# Patient Record
Sex: MALE | Race: OTHER | NOT HISPANIC OR LATINO | URBAN - METROPOLITAN AREA
[De-identification: names, ages, dates, MRNs, and addresses within clinical notes are randomized per-mention and may not be internally consistent; named-entity substitution may affect disease eponyms.]

---

## 2019-07-19 DIAGNOSIS — M25.561 RIGHT KNEE PAIN, UNSPECIFIED CHRONICITY: Primary | ICD-10-CM

## 2019-07-23 ENCOUNTER — HOSPITAL ENCOUNTER (OUTPATIENT)
Dept: RADIOLOGY | Facility: HOSPITAL | Age: 34
Discharge: HOME | End: 2019-07-23
Attending: ORTHOPAEDIC SURGERY
Payer: COMMERCIAL

## 2019-07-23 ENCOUNTER — OFFICE VISIT (OUTPATIENT)
Dept: ORTHOPEDICS | Facility: CLINIC | Age: 34
End: 2019-07-23
Payer: COMMERCIAL

## 2019-07-23 VITALS
BODY MASS INDEX: 23.8 KG/M2 | HEIGHT: 71 IN | DIASTOLIC BLOOD PRESSURE: 55 MMHG | WEIGHT: 170 LBS | HEART RATE: 43 BPM | SYSTOLIC BLOOD PRESSURE: 96 MMHG

## 2019-07-23 DIAGNOSIS — M25.571 CHRONIC PAIN OF RIGHT ANKLE: ICD-10-CM

## 2019-07-23 DIAGNOSIS — M76.821 POSTERIOR TIBIAL TENDINITIS OF RIGHT LEG: Primary | ICD-10-CM

## 2019-07-23 DIAGNOSIS — M25.561 RIGHT KNEE PAIN, UNSPECIFIED CHRONICITY: ICD-10-CM

## 2019-07-23 DIAGNOSIS — M25.571 RIGHT ANKLE PAIN, UNSPECIFIED CHRONICITY: Primary | ICD-10-CM

## 2019-07-23 DIAGNOSIS — M25.571 RIGHT ANKLE PAIN, UNSPECIFIED CHRONICITY: ICD-10-CM

## 2019-07-23 DIAGNOSIS — G89.29 CHRONIC PAIN OF RIGHT ANKLE: ICD-10-CM

## 2019-07-23 PROCEDURE — 73610 XR ANKLE COMPLETE 3 VIEW RIGHT: ICD-10-PCS | Mod: 26,RT,, | Performed by: RADIOLOGY

## 2019-07-23 PROCEDURE — 99999 PR PBB SHADOW E&M-EST. PATIENT-LVL III: CPT | Mod: PBBFAC,,, | Performed by: ORTHOPAEDIC SURGERY

## 2019-07-23 PROCEDURE — 99204 OFFICE O/P NEW MOD 45 MIN: CPT | Mod: S$GLB,,, | Performed by: ORTHOPAEDIC SURGERY

## 2019-07-23 PROCEDURE — 99999 PR PBB SHADOW E&M-EST. PATIENT-LVL III: ICD-10-PCS | Mod: PBBFAC,,, | Performed by: ORTHOPAEDIC SURGERY

## 2019-07-23 PROCEDURE — 73610 X-RAY EXAM OF ANKLE: CPT | Mod: 26,RT,, | Performed by: RADIOLOGY

## 2019-07-23 PROCEDURE — 73610 X-RAY EXAM OF ANKLE: CPT | Mod: TC,RT

## 2019-07-23 PROCEDURE — 99204 PR OFFICE/OUTPT VISIT, NEW, LEVL IV, 45-59 MIN: ICD-10-PCS | Mod: S$GLB,,, | Performed by: ORTHOPAEDIC SURGERY

## 2019-07-23 RX ORDER — CELECOXIB 200 MG/1
200 CAPSULE ORAL 2 TIMES DAILY
Qty: 28 CAPSULE | Refills: 0 | Status: SHIPPED | OUTPATIENT
Start: 2019-07-23 | End: 2019-08-06

## 2019-07-23 NOTE — PROGRESS NOTES
Subjective:     Patient ID: Md Sorenleoniechao Johnson is a 34 y.o. male.    Chief Complaint: Pain of the Right Ankle    Mr. Johnson is a .    He is here for evaluation of right medial ankle pain. He 1st started having pain back in January he noticed this might be related to the high steel toe boots which were rubbing on the area. He has been having a lot more pain since especially with running, no pain at the beginning of the day but after the end of the day after standing and walking he has a lot of this medial ankle pain, the pain is located well above the medial malleolus several cm are inches above the medial malleolus.  Seems to be the posterior medial border here of the tibia. No specific injury that he can recall.    Ankle Pain    The pain is present in the right ankle. The current episode started more than 1 month ago (6mo). The injury was the result of a action while at home. The problem occurs intermittently. The problem has been gradually worsening. The quality of the pain is described as aching. The pain is at a severity of 5/10. Pertinent negatives include no fever or itching. The symptoms are aggravated by activity, bearing weight, walking and standing. He has tried nothing for the symptoms. Physical therapy was not tried.      No past medical history on file.  No past surgical history on file.  No family history on file.  Social History     Socioeconomic History    Marital status:      Spouse name: Not on file    Number of children: Not on file    Years of education: Not on file    Highest education level: Not on file   Occupational History    Not on file   Social Needs    Financial resource strain: Not on file    Food insecurity:     Worry: Not on file     Inability: Not on file    Transportation needs:     Medical: Not on file     Non-medical: Not on file   Tobacco Use    Smoking status: Never Smoker    Smokeless tobacco: Never Used   Substance and Sexual Activity    Alcohol use:  Never     Frequency: Never    Drug use: Never    Sexual activity: Not on file   Lifestyle    Physical activity:     Days per week: Not on file     Minutes per session: Not on file    Stress: Not on file   Relationships    Social connections:     Talks on phone: Not on file     Gets together: Not on file     Attends [employer] service: Not on file     Active member of club or organization: Not on file     Attends meetings of clubs or organizations: Not on file     Relationship status: Not on file   Other Topics Concern    Not on file   Social History Narrative    Not on file       Review of patient's allergies indicates:  No Known Allergies  Review of Systems   Constitution: Negative for fever.   HENT: Negative for sore throat.    Eyes: Negative for blurred vision.   Cardiovascular: Negative for dyspnea on exertion.   Respiratory: Negative for shortness of breath.    Hematologic/Lymphatic: Does not bruise/bleed easily.   Skin: Negative for itching.   Gastrointestinal: Negative for vomiting.   Genitourinary: Negative for dysuria.   Neurological: Negative for dizziness.   Psychiatric/Behavioral: The patient does not have insomnia.        Objective:   Body mass index is 23.71 kg/m².  Vitals:    07/23/19 1458   BP: (!) 96/55   Pulse: (!) 43           General    Nursing note and vitals reviewed.  Constitutional: He is oriented to person, place, and time. He appears well-developed. No distress.   HENT:   Head: Normocephalic and atraumatic.   Eyes: EOM are normal.   Cardiovascular: Normal rate.    Pulmonary/Chest: Effort normal. No stridor.   Neurological: He is alert and oriented to person, place, and time.   Psychiatric: He has a normal mood and affect. His behavior is normal.     General Musculoskeletal Exam   Gait: normal     Right Ankle/Foot Exam     Inspection   Erythema: absent  Bruising: Ankle - absent   Effusion: Ankle - absent     Range of Motion   Ankle Joint   Dorsiflexion: 5 (5° with the knee extended,  flexed is 10° with the knee flexed)   Plantar flexion: 30   Subtalar Joint   Inversion: normal   Eversion: normal     Other   Ankle Crepitus: absent  Foot Crepitus:  absent  Sensation: normal    Comments:  He has moderate flatfoot bilaterally, decreased medial arch is, able to do single leg heel rise bilaterally without much discomfort    Palpation over the posterior border of the tibia in the expected course of the posterior tibial tendon is painful for him, and other than flatfoot is the most impressive physical exam findings    Left Ankle/Foot Exam     Inspection  Erythema: absent  Bruising: Ankle - absent   Effusion: Ankle - absent     Range of Motion   Ankle Joint  Dorsiflexion: 5 (5° with the knee extended, increases to 10° with the knee flexed)   Plantar flexion: 30     Subtalar Joint   Inversion: normal   Eversion: normal     Other   Foot Crepitus:  absent  Ankle Crepitus: absent  Sensation: normal        IMAGING three views of the right ankle ordered by me and interpreted by me demonstrate no obvious fracture or dislocation, no substantial degenerative changes to the ankle    Assessment:     Encounter Diagnoses   Name Primary?    Chronic pain of right ankle     Posterior tibial tendinitis of right leg Yes        Plan:       I had an in depth discussion today with Isauro today regarding his RIGHT ankle and distal leg problem, going over his radiographs and the model to help further his understanding. I explained the anatomy and pathophysiology of the problem. I told Isauro  that I believe the problem relates to differential including posterior tibial tendon tendinitis, stress fracture . We had an in depth discussion regarding appropriate treatment and management of his condition.     From a treatment standpoint, the decision was made to go forward with :     Discussed that it is reasonable to perform an MRI of the right ankle to further evaluate and rule out any stress fracture here given pain with running and  potential overuse injury, but overall I think this is relatively low on the differential.  He does not want to do the MRI and this is reasonable at this time but I discussed with him that if he is having a lot of pain that is not getting better, he needs to the MRI.    Rest, ice, activity modification    Stop running for now    Short Cam boot for rest    Stretching    Celebrex for 2 weeks, discussed pros and cons, black box warning, he expressed good understanding like to proceed, consider transition to Voltaren gel afterwards if needed    Follow-up in 8 weeks for recheck without new x-rays unless he is still having significant issues.  MRI right ankle in the interim if symptoms acutely worsen

## 2019-08-27 ENCOUNTER — TELEPHONE (OUTPATIENT)
Dept: ORTHOPEDICS | Facility: CLINIC | Age: 34
End: 2019-08-27

## 2019-08-27 NOTE — TELEPHONE ENCOUNTER
Called pt to reschedule his apt. Pt stated he is doing better and no longer wants the apt. I informed the pt if he need anything to call and make an apt. Pt understood.

## 2022-03-25 ENCOUNTER — HOSPITAL ENCOUNTER (OUTPATIENT)
Dept: RADIOLOGY | Facility: HOSPITAL | Age: 37
Discharge: HOME | End: 2022-03-25
Attending: FAMILY MEDICINE
Payer: COMMERCIAL

## 2022-03-25 ENCOUNTER — OFFICE VISIT (OUTPATIENT)
Dept: SPORTS MEDICINE | Facility: CLINIC | Age: 37
End: 2022-03-25
Payer: COMMERCIAL

## 2022-03-25 VITALS — WEIGHT: 170 LBS | HEIGHT: 71 IN | BODY MASS INDEX: 23.8 KG/M2

## 2022-03-25 DIAGNOSIS — M76.61 ACHILLES TENDINITIS OF BOTH LOWER EXTREMITIES: Primary | ICD-10-CM

## 2022-03-25 DIAGNOSIS — M79.604 BILATERAL LEG PAIN: Primary | ICD-10-CM

## 2022-03-25 DIAGNOSIS — S86.899A ANTERIOR SHIN SPLINTS: ICD-10-CM

## 2022-03-25 DIAGNOSIS — M76.62 ACHILLES TENDINITIS OF BOTH LOWER EXTREMITIES: Primary | ICD-10-CM

## 2022-03-25 DIAGNOSIS — M79.604 BILATERAL LEG PAIN: ICD-10-CM

## 2022-03-25 DIAGNOSIS — M79.605 BILATERAL LEG PAIN: Primary | ICD-10-CM

## 2022-03-25 DIAGNOSIS — M79.605 BILATERAL LEG PAIN: ICD-10-CM

## 2022-03-25 PROCEDURE — 73590 XR TIBIA FIBULA BILATERAL: ICD-10-PCS | Mod: 26,50,, | Performed by: RADIOLOGY

## 2022-03-25 PROCEDURE — 73590 X-RAY EXAM OF LOWER LEG: CPT | Mod: 26,50,, | Performed by: RADIOLOGY

## 2022-03-25 PROCEDURE — 73590 X-RAY EXAM OF LOWER LEG: CPT | Mod: TC,50

## 2022-03-25 PROCEDURE — 3008F BODY MASS INDEX DOCD: CPT | Mod: CPTII,S$GLB,, | Performed by: FAMILY MEDICINE

## 2022-03-25 PROCEDURE — 99999 PR PBB SHADOW E&M-EST. PATIENT-LVL III: ICD-10-PCS | Mod: PBBFAC,,, | Performed by: FAMILY MEDICINE

## 2022-03-25 PROCEDURE — 99204 PR OFFICE/OUTPT VISIT, NEW, LEVL IV, 45-59 MIN: ICD-10-PCS | Mod: S$GLB,,, | Performed by: FAMILY MEDICINE

## 2022-03-25 PROCEDURE — 3008F PR BODY MASS INDEX (BMI) DOCUMENTED: ICD-10-PCS | Mod: CPTII,S$GLB,, | Performed by: FAMILY MEDICINE

## 2022-03-25 PROCEDURE — 99999 PR PBB SHADOW E&M-EST. PATIENT-LVL III: CPT | Mod: PBBFAC,,, | Performed by: FAMILY MEDICINE

## 2022-03-25 PROCEDURE — 99204 OFFICE O/P NEW MOD 45 MIN: CPT | Mod: S$GLB,,, | Performed by: FAMILY MEDICINE

## 2022-03-25 NOTE — PROGRESS NOTES
Subjective:     Patient ID: Md Richmond Elizabeth is a 37 y.o. male.    Chief Complaint: Pain of the Left Lower Leg and Pain of the Right Lower Leg      HPI:  States that he likes to jog a few miles every day for general exercise but about 3 years ago began having pain in both lower legs.    He went to [facility] where Dr. rider was attempted but he did not have a good experience with this.  He was told that he had some type of Achilles tendinitis.    Since that time he has doing his best but not under any specific therapy plan.  He would like to be able to run and be more active again without pain    He currently has pain in the back of the heel and in the medial aspect of the tibia region sometimes a 6-10 pain and Mobic is mildly helpful.  History reviewed. No pertinent past medical history.  History reviewed. No pertinent surgical history.  History reviewed. No pertinent family history.  Social History     Socioeconomic History    Marital status:    Tobacco Use    Smoking status: Never Smoker    Smokeless tobacco: Never Used   Substance and Sexual Activity    Alcohol use: Never    Drug use: Never     No current outpatient medications on file.  Review of patient's allergies indicates:  No Known Allergies  Review of Systems   Constitutional: Negative for chills, fever and weight loss.   Respiratory: Negative for shortness of breath.    Cardiovascular: Negative for chest pain and palpitations.       Objective:   Body mass index is 23.71 kg/m².  There were no vitals filed for this visit.        Ortho/SPM Exam-alert oriented well-nourished well-developed ambulatory no acute distress    First ray after normal    Lower extremities-good musculature and tone noted in no acute deformities    With mild tenderness to the insertion points of the Achilles but full range of motion and no Achilles defect evident    Patient has tenderness at the medial tibial juncture.    Neurovascular  intact    Strength is full 5    Psychiatric good affect cognition    Plan-patient will start physical therapy here [place] and is expected to have good progress.  May use over-the-counter medicine and will torn gel as needed.    Patient Instructions   Ice for 15 minutes to legs as needed for pain associated with shin splints at Achilles tendinitis    Start physical therapy at [place]    Over-the-counter Voltaren gel applied to painful areas 3 times a day for 1 month    Tylenol or Advil twice a day as needed for pain    Recheck here 6 weeks      IMAGING: X-Ray Tibia Fibula Bilateral  Narrative: EXAMINATION:  XR TIBIA FIBULA BILATERAL    CLINICAL HISTORY:  Pain in right leg    TECHNIQUE:  AP and lateral views of the bilateral tibia and fibula    COMPARISON:  None    FINDINGS:  The tibia and fibula are intact bilaterally.  No acute fracture or dislocation.  No significant soft tissue swelling.  Dorsal and plantar calcaneal enthesophytes seen on the right and dorsal calcaneal enthesophytes seen on the left.  Impression: As above    Electronically signed by: Kameron Bains DO  Date:    03/25/2022  Time:    08:55       Radiographs / Imaging : Relevant imaging results reviewed by me and interpreted by me, discussed with the patient and / or family -x-rays reviewed by me and agree with report      Assessment:     Encounter Diagnoses   Name Primary?    Achilles tendinitis of both lower extremities Yes    Anterior shin splints         Plan:   Achilles tendinitis of both lower extremities    Anterior shin splints        The patient verbalized good understanding of the medical issues discussed today and expressed appreciation for the care provided.  Patient was given the opportunity to ask questions and be an active participant in their medical care. Patient had no further questions or concerns at this time.     The patient was encouraged to participate in appropriate physical activity.      Gabino Mata M.D.  [facility]        This note was dictated using voice recognition software and may have sound a like errors.

## 2022-03-25 NOTE — PATIENT INSTRUCTIONS
Ice for 15 minutes to legs as needed for pain associated with shin splints at Achilles tendinitis    Start physical therapy at [place]    Over-the-counter Voltaren gel applied to painful areas 3 times a day for 1 month    Tylenol or Advil twice a day as needed for pain    Recheck here 6 weeks

## 2022-05-09 ENCOUNTER — TELEPHONE (OUTPATIENT)
Dept: SPORTS MEDICINE | Facility: CLINIC | Age: 37
End: 2022-05-09
Payer: COMMERCIAL

## 2022-05-19 ENCOUNTER — CLINICAL SUPPORT (OUTPATIENT)
Dept: REHABILITATION | Facility: HOSPITAL | Age: 37
End: 2022-05-19
Payer: COMMERCIAL

## 2022-05-19 DIAGNOSIS — R53.1 DECREASED STRENGTH, ENDURANCE, AND MOBILITY: Primary | ICD-10-CM

## 2022-05-19 DIAGNOSIS — R68.89 DECREASED STRENGTH, ENDURANCE, AND MOBILITY: Primary | ICD-10-CM

## 2022-05-19 DIAGNOSIS — M76.62 ACHILLES TENDINITIS OF BOTH LOWER EXTREMITIES: ICD-10-CM

## 2022-05-19 DIAGNOSIS — M79.672 LEFT FOOT PAIN: ICD-10-CM

## 2022-05-19 DIAGNOSIS — M76.61 ACHILLES TENDINITIS OF BOTH LOWER EXTREMITIES: ICD-10-CM

## 2022-05-19 DIAGNOSIS — S86.899A ANTERIOR SHIN SPLINTS: ICD-10-CM

## 2022-05-19 DIAGNOSIS — Z74.09 DECREASED STRENGTH, ENDURANCE, AND MOBILITY: Primary | ICD-10-CM

## 2022-05-19 PROCEDURE — 97110 THERAPEUTIC EXERCISES: CPT | Performed by: PHYSICAL THERAPIST

## 2022-05-19 PROCEDURE — 97161 PT EVAL LOW COMPLEX 20 MIN: CPT | Performed by: PHYSICAL THERAPIST

## 2022-05-19 NOTE — PATIENT INSTRUCTIONS
Toe Series - Toe Yoga        Sit with knee stacked above ankle. Maintain the ball of the foot and heel on the floor the entire exercise.    1) Lift the big toe, keeping the little toes planted on the floor. Hold for 5 seconds  2) Lift the little toes, keeping the big toe planted on the floor. Hold for 5 seconds  3) Time yourself for 3 minutes, alternating positions 1 & 2 back and forth throughout that time.      Toe Series - Abduction/Adduction          Sit with knee stacked above ankle    1) Abduction - Spread toes as far apart from each other as possible. The toes may lift off the floor. Hold for seconds  2) Adduction - Push toes together. Hold for 5 seconds  3) Time yourself for 3 minutes, alternating positions 1 & 2 back and forth throughout that time.      Toe Series - Flexion/Extension        Sit with knee stacked above ankle    1) Flexion - Curl toes down toward floor. Hold for 5 seconds  2) Extension - Lift toes up toward ceiling. Hold for 5 seconds  3) Time yourself for 3 minutes, alternating positions 1 & 2 back and forth throughout that time.  4) Place towel roll under ball of foot, but keep heel on the ground.      Arch Raise        1) Sit in a chair with both feet placed flat on the floor   2) Raise the arch of your foot by sliding your big toe toward your heel without curling your toes or lifting your heel S  3) Hold the position for 3 seconds then relax and repeat  4) Time yourself for 3 minutes, relaxing and repeating throughout that time.  5) Variations can be performed by moving the feet farther away from you or turning the foot inward or outward to challenge the muscles from different positions.  6) Once you feel comfortable performing the short foot movement you can gradually progress to performing the exercise while standing and then eventually from a single-leg standing position.      CALF STRETCH WITH TOWEL - GASTROCNEMIUS        While in a seated position, place a towel around the ball of  your foot and pull your ankle back until a stretch is felt on your calf area.    Keep your knee in a straightened position during the stretch.    Hold stretch for 30 seconds and repeat 3 times on each side, 3 times per day       CALF STRETCH WITH TOWEL - SOLEUS        While in a seated position, place a towel around the ball of your foot and pull your ankle back until a stretch is felt on your calf area.    Keep your knee in a bent position during the stretch.    Hold stretch for 30 seconds and repeat 3 times on each side, 3 times per day       *All exercises listed above obtained from [email or web address]

## 2022-05-19 NOTE — PLAN OF CARE
"[facility]   Physical Therapy Initial Evaluation   Date: 5/19/2022   Name: Md Richmond Elizabeth  Clinic Number: [id number]    Therapy Diagnosis:    Encounter Diagnoses   Name Primary?    Decreased strength, endurance, and mobility Yes    Left foot pain     Achilles tendinitis of both lower extremities     Anterior shin splints       Physician: Gabino Mata MD     Physician Orders: PT Eval and Treat  Medical Diagnosis from Referral: achilles tendinitis of both lower extremities   Evaluation Date: 5/19/2022  Authorization Period Expiration: 3/25/2023  Plan of Care Expiration: 8/17/2022  Progress Note Due: 6/18/2022  Visit # / Visits authorized: 1/1   FOTO: 1/3     Precautions: Standard    Time In: 0709  Time Out: 0750  Total Billable Time (timed & untimed codes): 40 minutes    SUBJECTIVE   Date of onset: 2019    History of current condition - [facility] reports: that in 2019 he woke up with extreme pain in his achilles tendon. He saw a doctor a GUANACO, and he was diagnosed with achilles tendinitis. The pain never went away, so he was referred to PT. He could not attend PT in 2020 during COVID, so he started in 2021. He did not have a good experience at that location and only attended approximately 6 sessions. Patient was then given Meloxicam, which may help some but does not eliminate the pain. Patient reports that prior to this injury he would hike and run 5k's, etc. He was very active. This pain is severely limiting his activities. Patient reports that the more he walks throughout his day, the more severe his pain gets. The pain is mainly located on the left achilles tendon, but he does have some pain in his right shin     Falls: none    Exercise Regimen: none    Imaging: x-rays performed on 3/25/2022    Pain:  Current 7/10, worst 9/10, best 4/10   Location: left achilles tendon, right shin  Description: Aching, Sharp and "pinning" pain  Aggravating Factors: increased activity such as " walking or standing   Easing Factors: ice and Voltaren gel    Prior Therapy: Yes  Social History: Pt lives with their spouse  Occupation: Pt is a , and sometimes has to go to the field to do walking (~25%)  Prior Level of Function: Independent and pain free with all ADL, IADL, community mobility and functional activities.   Current Level of Function: Independent with all ADL, IADL, community mobility and functional activities with reports of increased pain and need for increased time and frequent breaks.      Dominant Extremity: right    Pts goals: Pt reported goals are to decrease overall pain levels in order to return to prior functional level. Ultimately patient would like to be able to return to being active and be able to run again.       Medical History:   No past medical history on file.    Surgical History:   Md Yi Johnson  has no past surgical history on file.    Medications:   Md currently has no medications in their medication list.    Allergies:   Review of patient's allergies indicates:  No Known Allergies       OBJECTIVE     RANGE OF MOTION:    Ankle/Foot AROM/PROM Right Left Pain/Dysfunction with Movement Goal   Dorsiflexion (20) 12 10 No pain with ROM measurements today 20   Plantarflexion (50) 45 42  50   Inversion (35) 30 30  35   Eversion (15) 10 10  15       STRENGTH:    L/E MMT Right Left Pain/Dysfunction with Movement Goal   Hip Flexion  5/5 5/5 No pain with MMT's today 4+/5 B   Hip Extension  4+/5 4+/5  4+/5 B   Hip Abduction  4/5 4/5  4+/5 B   Knee Extension 5/5 5/5  5/5 B   Knee Flexion 5/5 5/5  5/5 B   Ankle DF 5/5 5/5  5/5 B   Ankle PF 5/5 4+/5  5/5 B   Ankle Inversion 5/5 4+/5  5/5 B   Ankle Eversion 5/5 4+/5  5/5 B       MUSCLE LENGTH:     Muscle Tested  Right Left  Goal   Hip Flexors decreased decreased Normal B   Quadriceps decreased decreased Normal B   Hamstrings  decreased decreased Normal B   Piriformis  decreased decreased Normal B   Gastrocnemius  decreased  decreased Normal B   Soleus  decreased decreased Normal B       JOINT MOBILITY:     Joint Motion Tested Right Left  Goal   Talocrural Joint Normal Normal Normal B   Subtalar Joint Hypomobile Hypomobile Normal B       Sensation:  Sensation is intact to light touch     Palpation: Increased tone with palpation of left gastrocnemius , soleus and peroneals . Increased tenderness noted with palpation of left achilles tendon and right distal shin.     Gait Analysis: The patient ambulated with the following assistive device: none; the pt presents with the following gait abnormalities: decreased push off on left and ankle/foot supination on left      FUNCTION:     CMS Impairment/Limitation/Restriction for FOTO Foot Survey    Therapist reviewed FOTO scores for Nafi on 5/19/2022.   FOTO documents entered into [employer] - see Media section.    Limitation Score: 51%         TREATMENT       Nafi received the treatments listed below:        THERAPEUTIC EXERCISES to develop strength, endurance, ROM, flexibility, posture and core stabilization for (15) minutes including:    Intervention Performed Today    HEP established and discussed x See Patient Instructions for details, patient performed a few repetitions of each exercise in order to demonstrate understanding                                        Plan for Next Visit:        PATIENT EDUCATION AND HOME EXERCISES     Education provided:    PURPOSE: Patient educated on the impairments noted above and the effects of physical therapy intervention to improve overall condition and QOL.    EXERCISE: Patient was educated on all the above exercise prior/during/after for proper posture, positioning, and execution for safe performance with home exercise program.    STRENGTH: Patient educated on the importance of improved core and extremity strength in order to improve alignment of the spine and extremities with static positions and dynamic movement.    GAIT & BALANCE: Patient educated on the  "importance of strong core and lower extremity musculature in order to improve both static and dynamic balance, improve gait mechanics, reduce fall risk and improve household and community mobility.     Written Home Exercises Provided: yes.  Exercises were reviewed and Nafi was able to demonstrate them prior to the end of the session.  Nafi demonstrated good  understanding of the education provided. See EMR under Patient Instructions for exercises provided during therapy sessions.    ASSESSMENT     Md is a 37 y.o. male referred to outpatient Physical Therapy with a medical diagnosis of achilles tendinitis of both lower extremities. Pt presents with impairments including: decreased ROM, decreased strength, decreased joint mobility, decreased muscle length, gait abnormalities and decreased overall function.    Pt prognosis is Good.   Pt will benefit from skilled outpatient Physical Therapy to address the deficits stated above and in the chart below, provide pt/family education, and to maximize pt's level of independence.     Plan of care discussed with patient: Yes  Pt's spiritual, cultural and educational needs considered and patient is agreeable to the plan of care and goals as stated below:     Anticipated Barriers for therapy: chronicity of condition    Medical Necessity is demonstrated by the following  History  Co-morbidities and personal factors that may impact the plan of care Co-morbidities:   none    Personal Factors:   no deficits     low   Examination  Body Structures and Functions, activity limitations and participation restrictions that may impact the plan of care Body Regions:   lower extremities    Body Systems:    ROM  strength  gait  transfers  transitions  motor control  motor learning    Participation Restrictions:   See above in "Current Level of Function"     Activity limitations:   Learning and applying knowledge  no deficits    General Tasks and Commands  no deficits    Communication  no " deficits    Mobility  walking  running    Self care  no deficits    Domestic Life  shopping  cooking  doing house work (cleaning house, washing dishes, laundry)    Interactions/Relationships  no deficits    Life Areas  no deficits    Community and Social Life  community life  recreation and leisure         moderate   Clinical Presentation evolving clinical presentation with changing clinical characteristics moderate   Decision Making/ Complexity Score: low       GOALS:    Short Term Goals:  6 weeks Progress    1. Pain: Pt will demonstrate improved pain by reports of less than or equal to 7/10 worst pain on the verbal rating scale in order to progress toward maximal functional ability and improve QOL. PC   2. Function: Patient will demonstrate improved function as indicated by a functional limitation score of less than or equal to 40 out of 100 on FOTO. PC   3. Strength: Patient will improve strength to 50% of stated goals, listed in objective measures above, in order to progress towards independence with functional activities.  PC   4. Gait: Patient will demonstrate improved gait mechanics in order to improve functional mobility, improve quality of life, and decrease risk of further injury or fall.  PC   5. HEP: Patient will demonstrate independence with HEP in order to progress toward functional independence. PC     Long Term Goals:  12 weeks Progress   1. Pain: Pt will demonstrate improved pain by reports of less than or equal to 5/10 worst pain on the verbal rating scale in order to progress toward maximal functional ability and improve QOL.   PC   2. Function: Patient will demonstrate improved function as indicated by a functional limitation score of less than or equal to 29 out of 100 on FOTO. PC   3. Mobility: Patient will improve AROM to stated goals, listed in objective measures above, in order to return to maximal functional potential and improve quality of life. PC   4. Strength: Patient will improve  strength to stated goals, listed in objective measures above, in order to improve functional independence and quality of life. PC   5. Gait: Patient will demonstrate normalized gait mechanics with minimal compensation in order to return to PLOF. PC   6. Patient will return to normal ADL's, IADL's, community involvement, recreational activities, and work-related activities with less than or equal to 2/10 pain and maximal function.  PC   7. Patient will be able to return to recreational activities, such as working out and running with less pain and limitation.  PC     Goals Key:  PC= progressing/continue; PM= partially met;        DC= discontinue    PLAN   Plan of care Certification: 5/19/2022 to 8/17/2022.    Outpatient Physical Therapy 2 times weekly for 12 weeks to include any combination of the following interventions: virtual visits, dry needling, modalities, electrical stimulation (IFC, Pre-Mod, Attended with Functional Dry Needling), Cervical/Lumbar Traction, Gait Training, Manual Therapy, Neuromuscular Re-ed, Patient Education, Self Care, Therapeutic Exercise and Therapeutic Activites     Airam Kaye, PT, DPT      I CERTIFY THE NEED FOR THESE SERVICES FURNISHED UNDER THIS PLAN OF TREATMENT AND WHILE UNDER MY CARE   Physician's comments:     Physician's Signature: ___________________________________________________

## 2022-06-01 ENCOUNTER — CLINICAL SUPPORT (OUTPATIENT)
Dept: REHABILITATION | Facility: HOSPITAL | Age: 37
End: 2022-06-01
Payer: COMMERCIAL

## 2022-06-01 DIAGNOSIS — R68.89 DECREASED STRENGTH, ENDURANCE, AND MOBILITY: Primary | ICD-10-CM

## 2022-06-01 DIAGNOSIS — M79.672 LEFT FOOT PAIN: ICD-10-CM

## 2022-06-01 DIAGNOSIS — M76.62 ACHILLES TENDINITIS OF BOTH LOWER EXTREMITIES: ICD-10-CM

## 2022-06-01 DIAGNOSIS — Z74.09 DECREASED STRENGTH, ENDURANCE, AND MOBILITY: Primary | ICD-10-CM

## 2022-06-01 DIAGNOSIS — R53.1 DECREASED STRENGTH, ENDURANCE, AND MOBILITY: Primary | ICD-10-CM

## 2022-06-01 DIAGNOSIS — S86.899A ANTERIOR SHIN SPLINTS: ICD-10-CM

## 2022-06-01 DIAGNOSIS — M76.61 ACHILLES TENDINITIS OF BOTH LOWER EXTREMITIES: ICD-10-CM

## 2022-06-01 PROCEDURE — 97112 NEUROMUSCULAR REEDUCATION: CPT

## 2022-06-01 PROCEDURE — 97140 MANUAL THERAPY 1/> REGIONS: CPT

## 2022-06-01 PROCEDURE — 97110 THERAPEUTIC EXERCISES: CPT

## 2022-06-01 NOTE — PROGRESS NOTES
[facility]   Physical Therapy Treatment Note     Name: Md Yi Johnson  Clinic Number: [id number]    Therapy Diagnosis:   Encounter Diagnoses   Name Primary?    Decreased strength, endurance, and mobility Yes    Achilles tendinitis of both lower extremities     Anterior shin splints     Left foot pain      Physician: Gabino Mata MD    Visit Date: 6/1/2022    Physician Orders: PT Eval and Treat  Medical Diagnosis from Referral: achilles tendinitis of both lower extremities   Evaluation Date: 5/19/2022  Authorization Period Expiration: 5/19/2023  Plan of Care Expiration: 8/17/2022  Progress Note Due: 6/18/2022  Visit # / Visits authorized: 1/20 (+1 eval)  FOTO: 1/3     Time In: 7:05am  Time Out: 8:00am  Total Billable Time: 55 minutes    Precautions: Standard    SUBJECTIVE     Pt reports: His ankle feels about the same.  He was compliant with home exercise program.  Response to previous treatment: good  Functional change: none noted     Pain:  Current 7/10, worst 9/10, best 4/10   Location: left achilles tendon, right shin    OBJECTIVE     Objective Measures updated at progress report unless specified.     TREATMENT     Nafi received the treatments listed below:      Nafi received therapeutic exercises to develop strength, endurance, ROM, flexibility, posture and core stabilization for 28 minutes including:    Intervention Performed Today     Bike   Add next visit   Seated Heel Raise on 2inch x  3x12 10# on knee   Standing Heel Raises   x   3x12   Ankle 4-way x  2x30 each way    Step Tap on 4in x  3x10   Gastroc Stretch on wedge  x   [id number]   Soleus Stretch on wedge x  [id number]             Nafi received the following manual therapy techniques: Joint mobilizations, Myofacial release and Soft tissue Mobilization were applied to the: left ankle for 12 minutes, including:  IASTM to achilles tendon and heel  Soft Tissue to gastroc    Nafi participated in neuromuscular re-education  activities to improve: Balance, Coordination, Sense, Proprioception and Posture for 15 minutes. The following activities were included:    Intervention Performed Today     MOBO Taps x  2x30 even & odd   Single Leg Balance  x  [id number]   Toe Yoga x  30x   Marbles  x   1 round                                       Patient Education and Home Exercises     Education provided:   · PURPOSE: Patient educated on the impairments noted above and the effects of physical therapy intervention to improve overall condition and QOL.   · EXERCISE: Patient was educated on all the above exercise prior/during/after for proper posture, positioning, and execution for safe performance with home exercise program.   · STRENGTH: Patient educated on the importance of improved core and extremity strength in order to improve alignment of the spine and extremities with static positions and dynamic movement.   · GAIT & BALANCE: Patient educated on the importance of strong core and lower extremity musculature in order to improve both static and dynamic balance, improve gait mechanics, reduce fall risk and improve household and community mobility.      Written Home Exercises Provided: yes.  Exercises were reviewed and Nafi was able to demonstrate them prior to the end of the session.  Nafi demonstrated good  understanding of the education provided. See EMR under Patient Instructions for exercises provided during therapy sessions.    ASSESSMENT     Movements were added today to work on strengthening and muscle control. IASTM was performed to the achilles tendon along with soft tissue to the gastroc. Patient had relief in ankle tension following manual therapy. Overall, patient tolerated today's session very well.     Isauro Is progressing well towards his goals.   Pt prognosis is Good.     Pt will continue to benefit from skilled outpatient physical therapy to address the deficits listed in the problem list box on initial evaluation, provide pt/family  education and to maximize pt's level of independence in the home and community environment.     Pt's spiritual, cultural and educational needs considered and pt agreeable to plan of care and goals.     Anticipated barriers to physical therapy: chronicity of condition    GOALS:     Short Term Goals:  6 weeks Progress    1. Pain: Pt will demonstrate improved pain by reports of less than or equal to 7/10 worst pain on the verbal rating scale in order to progress toward maximal functional ability and improve QOL. PC   2. Function: Patient will demonstrate improved function as indicated by a functional limitation score of less than or equal to 40 out of 100 on FOTO. PC   3. Strength: Patient will improve strength to 50% of stated goals, listed in objective measures above, in order to progress towards independence with functional activities.  PC   4. Gait: Patient will demonstrate improved gait mechanics in order to improve functional mobility, improve quality of life, and decrease risk of further injury or fall.  PC   5. HEP: Patient will demonstrate independence with HEP in order to progress toward functional independence. PC      Long Term Goals:  12 weeks Progress   1. Pain: Pt will demonstrate improved pain by reports of less than or equal to 5/10 worst pain on the verbal rating scale in order to progress toward maximal functional ability and improve QOL.   PC   2. Function: Patient will demonstrate improved function as indicated by a functional limitation score of less than or equal to 29 out of 100 on FOTO. PC   3. Mobility: Patient will improve AROM to stated goals, listed in objective measures above, in order to return to maximal functional potential and improve quality of life. PC   4. Strength: Patient will improve strength to stated goals, listed in objective measures above, in order to improve functional independence and quality of life. PC   5. Gait: Patient will demonstrate normalized gait mechanics with  minimal compensation in order to return to PLOF. PC   6. Patient will return to normal ADL's, IADL's, community involvement, recreational activities, and work-related activities with less than or equal to 2/10 pain and maximal function.  PC   7. Patient will be able to return to recreational activities, such as working out and running with less pain and limitation.  PC      Goals Key:  PC= progressing/continue;        PM= partially met;             DC= discontinue    PLAN     Continue with physical therapy as planned.     Plan of care Certification: 5/19/2022 to 8/17/2022.    Chavez Pinto, PT, DPT

## 2022-06-06 ENCOUNTER — OFFICE VISIT (OUTPATIENT)
Dept: FAMILY MEDICINE | Facility: CLINIC | Age: 37
End: 2022-06-06
Payer: COMMERCIAL

## 2022-06-06 DIAGNOSIS — U07.1 COVID: Primary | ICD-10-CM

## 2022-06-06 PROCEDURE — 99213 OFFICE O/P EST LOW 20 MIN: CPT | Mod: 95,,, | Performed by: INTERNAL MEDICINE

## 2022-06-06 PROCEDURE — 99213 PR OFFICE/OUTPT VISIT, EST, LEVL III, 20-29 MIN: ICD-10-PCS | Mod: 95,,, | Performed by: INTERNAL MEDICINE

## 2022-06-06 NOTE — LETTER
June 6, 2022    Md Yi Johnson  [address]  [place] [address]             [facility]  [address]  [place] [address]  Phone: [phone]  Fax: [phone] To whom it may concern:    Mr. Johnson was diagnosed with covid on 6/2/22, and is now clear of symptoms and may return to work on Wednesday, 6/8/22.    Sincerely,      Simran Queen MD

## 2022-06-06 NOTE — PROGRESS NOTES
Primary Care Telemedicine Note  The patient location is:  Patient Home   The chief complaint leading to consultation is: 6/2/22 symptoms, 6/6 covid test positive.    Visit type: Virtual visit with synchronous audio only and video  Each patient to whom he or she provides medical services by telemedicine is:  (1) informed of the relationship between the physician and patient and the respective role of any other health care provider with respect to management of the patient; and (2) notified that he or she may decline to receive medical services by telemedicine and may withdraw from such care at any time.      HPI:  Started with fever/sneeze/cough started 6/1/22, tested postive next day.  Now feels good, little drainage/cough.  No short of breath.  Needs a return to work note.    Problem List Items Addressed This Visit    None     Visit Diagnoses     COVID    -  Primary          The patient's Health Maintenance was reviewed and the following appears to be due at this time:  Health Maintenance Due   Topic Date Due    Hepatitis C Screening  Never done    Lipid Panel  Never done    HIV Screening  Never done    TETANUS VACCINE  Never done    COVID-19 Vaccine (3 - Booster for Pfizer series) 08/23/2021       [unfilled]  No outpatient medications prior to visit.     No facility-administered medications prior to visit.        Physical Exam   No flowsheet data found.  No flowsheet data found.      Constitutional: The patient appears well-developed and well-nourished. No distress.   Psychiatric: The mood appears not anxious and the affect is not angry, not blunt, not labile and not inappropriate. Speech is not rapid and/or pressured, not delayed, not tangential and not slurred. The patient is not agitated, not aggressive, is not hyperactive, not slowed, not withdrawn, not actively hallucinating and not combative. Thought content is not paranoid and not delusional. Cognition and memory are not impaired. The patient does  not express impulsivity or inappropriate judgment and does not exhibit a depressed mood. The patient expresses no homicidal and no suicidal ideation and has no suicidal plans and no homicidal plans. The patient is communicative and exhibits a normal recent memory and normal remote memory. The patient is attentive.     Encounter Diagnosis   Name Primary?    COVID Yes       PLAN:    Isauro Johnson does not currently have medications on file.    Diagnoses and all orders for this visit:    COVID, dx 6/2/22, no more fever or other significant symptoms - ok to RTW 6/8/22.  Letter written for pt.              No orders of the defined types were placed in this encounter.

## 2022-06-24 ENCOUNTER — CLINICAL SUPPORT (OUTPATIENT)
Dept: REHABILITATION | Facility: HOSPITAL | Age: 37
End: 2022-06-24
Payer: COMMERCIAL

## 2022-06-24 DIAGNOSIS — M76.61 ACHILLES TENDINITIS OF BOTH LOWER EXTREMITIES: ICD-10-CM

## 2022-06-24 DIAGNOSIS — M76.62 ACHILLES TENDINITIS OF BOTH LOWER EXTREMITIES: ICD-10-CM

## 2022-06-24 DIAGNOSIS — R53.1 DECREASED STRENGTH, ENDURANCE, AND MOBILITY: Primary | ICD-10-CM

## 2022-06-24 DIAGNOSIS — R68.89 DECREASED STRENGTH, ENDURANCE, AND MOBILITY: Primary | ICD-10-CM

## 2022-06-24 DIAGNOSIS — Z74.09 DECREASED STRENGTH, ENDURANCE, AND MOBILITY: Primary | ICD-10-CM

## 2022-06-24 DIAGNOSIS — S86.899A ANTERIOR SHIN SPLINTS: ICD-10-CM

## 2022-06-24 DIAGNOSIS — M79.672 LEFT FOOT PAIN: ICD-10-CM

## 2022-06-24 PROCEDURE — 97110 THERAPEUTIC EXERCISES: CPT

## 2022-06-24 PROCEDURE — 97112 NEUROMUSCULAR REEDUCATION: CPT

## 2022-06-24 NOTE — PROGRESS NOTES
"[facility]   Physical Therapy Treatment Note     Name: Md Yi Johnson  Clinic Number: [id number]    Therapy Diagnosis:   Encounter Diagnoses   Name Primary?    Decreased strength, endurance, and mobility Yes    Achilles tendinitis of both lower extremities     Anterior shin splints     Left foot pain      Physician: Gabino Mata MD    Visit Date: 6/24/2022    Physician Orders: PT Eval and Treat  Medical Diagnosis from Referral: achilles tendinitis of both lower extremities   Evaluation Date: 5/19/2022  Authorization Period Expiration: 5/19/2023  Plan of Care Expiration: 8/17/2022  Progress Note Due: 6/18/2022  Visit # / Visits authorized: 2/20 (+1 eval)  FOTO: 1/3     Time In: 7:05am  Time Out: 7:50am  Total Billable Time: 45 minutes    Precautions: Standard    SUBJECTIVE     Pt reports: His ankle feels about the same but he did feel better for a couple of days after last visit  He was compliant with home exercise program.  Response to previous treatment: good  Functional change: none noted     Pain:  Current 7/10, worst 9/10, best 4/10   Location: left achilles tendon, right shin    OBJECTIVE     Objective Measures updated at progress report unless specified.     TREATMENT     Nafi received the treatments listed below:      Nafi received therapeutic exercises to develop strength, endurance, ROM, flexibility, posture and core stabilization for 32 minutes including:    Intervention Performed Today     Bike x 7', L3   Seated Heel Raise on 2inch   3x12 10# on knee   Eccentric Heel Raise Left   x  2 x 10   Ankle Plantaflexion Isometric x 3 x 30" left, blue   Lateral heel tap x 2 x 10, 6"   Gastroc Stretch on wedge    [id number]   Soleus Stretch on wedge   [id number]   Wall sit with heel raise x 2 x 10     Nafi received the following manual therapy techniques: Joint mobilizations, Myofacial release and Soft tissue Mobilization were applied to the: left ankle for 0 minutes, " "including:  IASTM to achilles tendon and heel  Soft Tissue to gastroc    Nafi participated in neuromuscular re-education activities to improve: Balance, Coordination, Sense, Proprioception and Posture for 12 minutes. The following activities were included:    Intervention Performed Today     MOBO Taps   2x30 even & odd   Single Leg Dumbbell Pass  x 3 x 8, 5#   Toe Yoga x  30x   Marbles    1 round   Fwd Step Up and balance x 2 x 12, 8"                               Patient Education and Home Exercises     Education provided:   · PURPOSE: Patient educated on the impairments noted above and the effects of physical therapy intervention to improve overall condition and QOL.   · EXERCISE: Patient was educated on all the above exercise prior/during/after for proper posture, positioning, and execution for safe performance with home exercise program.   · STRENGTH: Patient educated on the importance of improved core and extremity strength in order to improve alignment of the spine and extremities with static positions and dynamic movement.   · GAIT & BALANCE: Patient educated on the importance of strong core and lower extremity musculature in order to improve both static and dynamic balance, improve gait mechanics, reduce fall risk and improve household and community mobility.      Written Home Exercises Provided: yes.  Exercises were reviewed and Soreni was able to demonstrate them prior to the end of the session.  Nafi demonstrated good  understanding of the education provided. See EMR under Patient Instructions for exercises provided during therapy sessions.    ASSESSMENT     The patient was educated regarding wearing a sandal/shoe with small heel while in home versus barefoot until symptoms are less reactive and updated HEP to include soleus strengthening    Soreni Is progressing well towards his goals.   Pt prognosis is Good.     Pt will continue to benefit from skilled outpatient physical therapy to address the deficits " listed in the problem list box on initial evaluation, provide pt/family education and to maximize pt's level of independence in the home and community environment.     Pt's spiritual, cultural and educational needs considered and pt agreeable to plan of care and goals.     Anticipated barriers to physical therapy: chronicity of condition    GOALS:     Short Term Goals:  6 weeks Progress    1. Pain: Pt will demonstrate improved pain by reports of less than or equal to 7/10 worst pain on the verbal rating scale in order to progress toward maximal functional ability and improve QOL. PC   2. Function: Patient will demonstrate improved function as indicated by a functional limitation score of less than or equal to 40 out of 100 on FOTO. PC   3. Strength: Patient will improve strength to 50% of stated goals, listed in objective measures above, in order to progress towards independence with functional activities.  PC   4. Gait: Patient will demonstrate improved gait mechanics in order to improve functional mobility, improve quality of life, and decrease risk of further injury or fall.  PC   5. HEP: Patient will demonstrate independence with HEP in order to progress toward functional independence. PC      Long Term Goals:  12 weeks Progress   1. Pain: Pt will demonstrate improved pain by reports of less than or equal to 5/10 worst pain on the verbal rating scale in order to progress toward maximal functional ability and improve QOL.   PC   2. Function: Patient will demonstrate improved function as indicated by a functional limitation score of less than or equal to 29 out of 100 on FOTO. PC   3. Mobility: Patient will improve AROM to stated goals, listed in objective measures above, in order to return to maximal functional potential and improve quality of life. PC   4. Strength: Patient will improve strength to stated goals, listed in objective measures above, in order to improve functional independence and quality of life.  PC   5. Gait: Patient will demonstrate normalized gait mechanics with minimal compensation in order to return to PLOF. PC   6. Patient will return to normal ADL's, IADL's, community involvement, recreational activities, and work-related activities with less than or equal to 2/10 pain and maximal function.  PC   7. Patient will be able to return to recreational activities, such as working out and running with less pain and limitation.  PC      Goals Key:  PC= progressing/continue;        PM= partially met;             DC= discontinue    PLAN     Continue with physical therapy as planned.     Plan of care Certification: 5/19/2022 to 8/17/2022.    Aj Saini, PT, DPT

## 2022-07-01 ENCOUNTER — CLINICAL SUPPORT (OUTPATIENT)
Dept: REHABILITATION | Facility: HOSPITAL | Age: 37
End: 2022-07-01
Payer: COMMERCIAL

## 2022-07-01 DIAGNOSIS — M76.61 ACHILLES TENDINITIS OF BOTH LOWER EXTREMITIES: ICD-10-CM

## 2022-07-01 DIAGNOSIS — S86.899A ANTERIOR SHIN SPLINTS: ICD-10-CM

## 2022-07-01 DIAGNOSIS — R53.1 DECREASED STRENGTH, ENDURANCE, AND MOBILITY: Primary | ICD-10-CM

## 2022-07-01 DIAGNOSIS — Z74.09 DECREASED STRENGTH, ENDURANCE, AND MOBILITY: Primary | ICD-10-CM

## 2022-07-01 DIAGNOSIS — M76.62 ACHILLES TENDINITIS OF BOTH LOWER EXTREMITIES: ICD-10-CM

## 2022-07-01 DIAGNOSIS — M79.672 LEFT FOOT PAIN: ICD-10-CM

## 2022-07-01 DIAGNOSIS — R68.89 DECREASED STRENGTH, ENDURANCE, AND MOBILITY: Primary | ICD-10-CM

## 2022-07-01 PROCEDURE — 97112 NEUROMUSCULAR REEDUCATION: CPT | Performed by: PHYSICAL THERAPIST

## 2022-07-01 PROCEDURE — 97110 THERAPEUTIC EXERCISES: CPT | Performed by: PHYSICAL THERAPIST

## 2022-07-01 NOTE — PROGRESS NOTES
[facility]   Physical Therapy Treatment Note + Progress Note    Name: Md Yi Johnson  Clinic Number: [id number]    Therapy Diagnosis:   Encounter Diagnoses   Name Primary?    Decreased strength, endurance, and mobility Yes    Achilles tendinitis of both lower extremities     Anterior shin splints     Left foot pain      Physician: Gabino Mata MD    Visit Date: 7/1/2022    Physician Orders: PT Eval and Treat  Medical Diagnosis from Referral: achilles tendinitis of both lower extremities   Evaluation Date: 5/19/2022  Authorization Period Expiration: 5/19/2023  Plan of Care Expiration: 8/17/2022  Progress Note Due: 6/18/2022  Visit # / Visits authorized: 3/20 (+1 eval)  FOTO: 1/3     Time In: 0707  Time Out: 7:50  Total Billable Time: 40 minutes    Precautions: Standard    SUBJECTIVE     Pt reports: that he feels about the same overall. He felt good for a little while following his first return visit, but he did not feel any different following last visit.     He was not compliant with home exercise program.  Response to previous treatment: good  Functional change: none noted     Pain:  Current 7/10, worst 9/10, best 4/10   Location: left achilles tendon, right shin    OBJECTIVE     Objective Measures updated at progress report unless specified.     RANGE OF MOTION:     Ankle/Foot AROM/PROM Right Left Pain/Dysfunction with Movement Goal   Dorsiflexion (20) 12  (12 initial) 10  (10 initial) No pain with ROM measurements today 20   Plantarflexion (50) 45  (45 initial) 44  (42 initial)   50   Inversion (35) 30  (30 initial) 30  (30 initial)   35   Eversion (15) 12  (10 initial) 14  (10 initial)   15         STRENGTH:     L/E MMT Right Left Pain/Dysfunction with Movement Right   7/1/2022  Left  7/1/2022  Goal   Hip Flexion  5/5 5/5 No pain with MMT's today 5 6 4+/5 B   Hip Extension  4+/5 4+/5   4+ 4+ 4+/5 B   Hip Abduction  4/5 4/5   4 4 4+/5 B   Knee Extension 5/5 5/5   5 5 5/5 B  "  Knee Flexion 5/5 5/5   5 5 5/5 B   Ankle DF 5/5 5/5   5 5 5/5 B   Ankle PF 5/5 4+/5   5 4+ 5/5 B   Ankle Inversion 5/5 4+/5   5 4+ 5/5 B   Ankle Eversion 5/5 4+/5   5 4+ 5/5 B         MUSCLE LENGTH:      Muscle Tested  Right Left  Goal   Hip Flexors decreased decreased Normal B   Quadriceps decreased decreased Normal B   Hamstrings  decreased decreased Normal B   Piriformis  decreased decreased Normal B   Gastrocnemius  decreased decreased Normal B   Soleus  decreased decreased Normal B         JOINT MOBILITY:      Joint Motion Tested Right Left  Goal   Talocrural Joint Normal Normal Normal B   Subtalar Joint Hypomobile Hypomobile Normal B         Sensation:  Sensation is intact to light touch      Palpation: Increased tone with palpation of left gastrocnemius , soleus and peroneals . Increased tenderness noted with palpation of left achilles tendon and right distal shin.      Gait Analysis: The patient ambulated with the following assistive device: none; the pt presents with the following gait abnormalities: decreased push off on left and ankle/foot supination on left        TREATMENT     Nafi received the treatments listed below:      Nafi received therapeutic exercises to develop strength, endurance, ROM, flexibility, posture and core stabilization for 30 minutes including:    Intervention Performed Today     Bike x 5', L4   Seated Heel Raise on 2inch   3x12 10# on knee   Eccentric Heel Raises  x  1 x 10 each foot   Ankle Plantaflexion Isometric  3 x 30" left, blue   Lateral heel tap x 1 x 10, 6" each LE   Gastroc Stretch on wedge    [id number]   Soleus Stretch on wedge   [id number]   Wall sit with heel raise  2 x 10   Progress Note x ROM and MMT re-assessment     Nafi received the following manual therapy techniques: Joint mobilizations, Myofacial release and Soft tissue Mobilization were applied to the: left ankle for 0 minutes, including:  IASTM to achilles tendon and heel  Soft Tissue to gastroc    Nafi " "participated in neuromuscular re-education activities to improve: Balance, Coordination, Sense, Proprioception and Posture for 10 minutes. The following activities were included:    Intervention Performed Today     MOBO Taps x  2x30 even & odd, each LE   Single Leg Dumbbell Pass   3 x 8, 5#   Toe Yoga x  3 x 10, education on how to perform exercise again and on performing as part of HEP   Marbles    1 round   Fwd Step Up and balance  2 x 12, 8"                               Patient Education and Home Exercises     Education provided:   · PURPOSE: Patient educated on the impairments noted above and the effects of physical therapy intervention to improve overall condition and QOL.   · EXERCISE: Patient was educated on all the above exercise prior/during/after for proper posture, positioning, and execution for safe performance with home exercise program.   · STRENGTH: Patient educated on the importance of improved core and extremity strength in order to improve alignment of the spine and extremities with static positions and dynamic movement.   · GAIT & BALANCE: Patient educated on the importance of strong core and lower extremity musculature in order to improve both static and dynamic balance, improve gait mechanics, reduce fall risk and improve household and community mobility.      Written Home Exercises Provided: yes.  Exercises were reviewed and Nafi was able to demonstrate them prior to the end of the session.  Nafi demonstrated good  understanding of the education provided. See EMR under Patient Instructions for exercises provided during therapy sessions.    ASSESSMENT     The patient tolerated treatment well. He has completed 3 PT treatment sessions thus far and admits that he has not been compliant with HEP; therefore, his objective measurements remain about the same overall as compared to initial evaluation. Patient was educated again on importance of keeping up with HEP and benefits of strengthening foot " intrinsics. He expressed understanding. PT also educated patient on importance of strengthening up and down the chain (foot, ankle, knee, hip, and core), and how everything is connected. He responded very well to mobo board today. Patient would benefit from continued PT at a consistent schedule for the remainder of his approved visits.     Soreni Is progressing well towards his goals.   Pt prognosis is Good.     Pt will continue to benefit from skilled outpatient physical therapy to address the deficits listed in the problem list box on initial evaluation, provide pt/family education and to maximize pt's level of independence in the home and community environment.     Pt's spiritual, cultural and educational needs considered and pt agreeable to plan of care and goals.     Anticipated barriers to physical therapy: chronicity of condition    GOALS:     Short Term Goals:  6 weeks Progress    1. Pain: Pt will demonstrate improved pain by reports of less than or equal to 7/10 worst pain on the verbal rating scale in order to progress toward maximal functional ability and improve QOL. PC   2. Function: Patient will demonstrate improved function as indicated by a functional limitation score of less than or equal to 40 out of 100 on FOTO. PC   3. Strength: Patient will improve strength to 50% of stated goals, listed in objective measures above, in order to progress towards independence with functional activities.  PC   4. Gait: Patient will demonstrate improved gait mechanics in order to improve functional mobility, improve quality of life, and decrease risk of further injury or fall.  PC   5. HEP: Patient will demonstrate independence with HEP in order to progress toward functional independence. PC      Long Term Goals:  12 weeks Progress   1. Pain: Pt will demonstrate improved pain by reports of less than or equal to 5/10 worst pain on the verbal rating scale in order to progress toward maximal functional ability and  improve QOL.   PC   2. Function: Patient will demonstrate improved function as indicated by a functional limitation score of less than or equal to 29 out of 100 on FOTO. PC   3. Mobility: Patient will improve AROM to stated goals, listed in objective measures above, in order to return to maximal functional potential and improve quality of life. PC   4. Strength: Patient will improve strength to stated goals, listed in objective measures above, in order to improve functional independence and quality of life. PC   5. Gait: Patient will demonstrate normalized gait mechanics with minimal compensation in order to return to PLOF. PC   6. Patient will return to normal ADL's, IADL's, community involvement, recreational activities, and work-related activities with less than or equal to 2/10 pain and maximal function.  PC   7. Patient will be able to return to recreational activities, such as working out and running with less pain and limitation.  PC      Goals Key:  PC= progressing/continue;        PM= partially met;             DC= discontinue    PLAN     Continue with physical therapy as planned.     7/1/2022: It is my recommendation that patient continue with PT at his current frequency of 2 times per week for the remainder of his approved visits. His treatment plan will remain the same, and he will be progressed appropriately.     Airam Kaye, PT, DPT

## 2022-07-07 ENCOUNTER — CLINICAL SUPPORT (OUTPATIENT)
Dept: REHABILITATION | Facility: HOSPITAL | Age: 37
End: 2022-07-07
Payer: COMMERCIAL

## 2022-07-07 DIAGNOSIS — R68.89 DECREASED STRENGTH, ENDURANCE, AND MOBILITY: Primary | ICD-10-CM

## 2022-07-07 DIAGNOSIS — S86.899A ANTERIOR SHIN SPLINTS: ICD-10-CM

## 2022-07-07 DIAGNOSIS — M76.62 ACHILLES TENDINITIS OF BOTH LOWER EXTREMITIES: ICD-10-CM

## 2022-07-07 DIAGNOSIS — M79.672 LEFT FOOT PAIN: ICD-10-CM

## 2022-07-07 DIAGNOSIS — Z74.09 DECREASED STRENGTH, ENDURANCE, AND MOBILITY: Primary | ICD-10-CM

## 2022-07-07 DIAGNOSIS — R53.1 DECREASED STRENGTH, ENDURANCE, AND MOBILITY: Primary | ICD-10-CM

## 2022-07-07 DIAGNOSIS — M76.61 ACHILLES TENDINITIS OF BOTH LOWER EXTREMITIES: ICD-10-CM

## 2022-07-07 PROCEDURE — 97140 MANUAL THERAPY 1/> REGIONS: CPT | Performed by: PHYSICAL THERAPIST

## 2022-07-07 PROCEDURE — 97112 NEUROMUSCULAR REEDUCATION: CPT | Performed by: PHYSICAL THERAPIST

## 2022-07-07 PROCEDURE — 97110 THERAPEUTIC EXERCISES: CPT | Performed by: PHYSICAL THERAPIST

## 2022-07-07 NOTE — PROGRESS NOTES
"[facility]   Physical Therapy Treatment Note     Name: Md Yi Johnsno  Clinic Number: [id number]    Therapy Diagnosis:   Encounter Diagnoses   Name Primary?    Decreased strength, endurance, and mobility Yes    Achilles tendinitis of both lower extremities     Anterior shin splints     Left foot pain      Physician: Gabino Mata MD    Visit Date: 7/7/2022    Physician Orders: PT Eval and Treat  Medical Diagnosis from Referral: achilles tendinitis of both lower extremities   Evaluation Date: 5/19/2022  Authorization Period Expiration: 5/19/2023  Plan of Care Expiration: 8/17/2022  Progress Note Due: 6/18/2022  Visit # / Visits authorized: 4/20 (+1 eval)  FOTO: 1/3     Time In: 0713  Time Out: 0750  Total Billable Time: 37 minutes    Precautions: Standard    SUBJECTIVE     Pt reports: that he has been doing his toe yoga and eccentric heel raises at home, and he maybe feels a little better. He states that he can already better perform toe yoga. PT noted that this was good progress but to also attempt to include other aspects of HEP, and he expressed understanding.     He was partially compliant with home exercise program.  Response to previous treatment: good  Functional change: improvement in toe yoga     Pain:  Current 5-6/10,   Location: left achilles tendon, right shin    OBJECTIVE     Objective Measures updated at progress report unless specified.      TREATMENT     Isauro received the treatments listed below:      Isauro received therapeutic exercises to develop strength, endurance, ROM, flexibility, posture and core stabilization for 10 minutes including:    Intervention Performed Today     Bike  5', L4   Elliptical for ROM and strength x 6' L4   Seated Heel Raise on 2inch   3x12 10# on knee   Eccentric Heel Raises   1 x 10 each foot   Ankle Plantaflexion Isometric  3 x 30" left, blue   Lateral heel tap  1 x 10, 6" each LE   Gastroc Stretch on wedge x  [id number]   Soleus Stretch " "on wedge x  [id number]   Wall sit with heel raise  2 x 10   Progress Note  ROM and MMT re-assessment         Nafi participated in neuromuscular re-education activities to improve: Balance, Coordination, Sense, Proprioception and Posture for 15 minutes. The following activities were included:    Intervention Performed Today     MOBO Taps x  2x30 even & odd, each LE   Single Leg Dumbbell Pass   3 x 8, 5#   Toe Yoga   3 x 10, education on how to perform exercise again and on performing as part of HEP   Toe abduction/adduction x 2', each foot   Toe flexion/extension over roller x 2' each foot   Short foot x 2' each foot   Marbles    1 round   Fwd Step Up and balance  2 x 12, 8"                             MANUAL THERAPY TECHNIQUES were applied for (12) minutes, including:    Manual Intervention Performed Today    Soft Tissue Mobilization x bilateral gastrocnemius , soleus, tibialis posterior, foot intrinsics and plantar fascia   Joint Mobilizations     Mobilization with movement          Functional Dry Needling        Plan for Next Visit:         Patient Education and Home Exercises     Education provided:   · PURPOSE: Patient educated on the impairments noted above and the effects of physical therapy intervention to improve overall condition and QOL.   · EXERCISE: Patient was educated on all the above exercise prior/during/after for proper posture, positioning, and execution for safe performance with home exercise program.   · STRENGTH: Patient educated on the importance of improved core and extremity strength in order to improve alignment of the spine and extremities with static positions and dynamic movement.   · GAIT & BALANCE: Patient educated on the importance of strong core and lower extremity musculature in order to improve both static and dynamic balance, improve gait mechanics, reduce fall risk and improve household and community mobility.      Written Home Exercises Provided: yes.  Exercises were reviewed and " Isauro was able to demonstrate them prior to the end of the session.  Isauro demonstrated good  understanding of the education provided. See EMR under Patient Instructions for exercises provided during therapy sessions.    ASSESSMENT     Patient did well with treatment today. He completed exercises with only minimal difficulty but without increased complaint. Improved foot intrinsic strength noted with toe yoga today. Performed additional foot intrinsic strengthening exercises today and reminded patient to include those in HEP as well. He expressed understanding. Increased tone and soft tissue adhesions noted along gastrocnemius and soleus musculature, which decreased some following manual therapy.     Isauro Is progressing well towards his goals.   Pt prognosis is Good.     Pt will continue to benefit from skilled outpatient physical therapy to address the deficits listed in the problem list box on initial evaluation, provide pt/family education and to maximize pt's level of independence in the home and community environment.     Pt's spiritual, cultural and educational needs considered and pt agreeable to plan of care and goals.     Anticipated barriers to physical therapy: chronicity of condition    GOALS:     Short Term Goals:  6 weeks Progress    1. Pain: Pt will demonstrate improved pain by reports of less than or equal to 7/10 worst pain on the verbal rating scale in order to progress toward maximal functional ability and improve QOL. PC   2. Function: Patient will demonstrate improved function as indicated by a functional limitation score of less than or equal to 40 out of 100 on FOTO. PC   3. Strength: Patient will improve strength to 50% of stated goals, listed in objective measures above, in order to progress towards independence with functional activities.  PC   4. Gait: Patient will demonstrate improved gait mechanics in order to improve functional mobility, improve quality of life, and decrease risk of  further injury or fall.  PC   5. HEP: Patient will demonstrate independence with HEP in order to progress toward functional independence. PC      Long Term Goals:  12 weeks Progress   1. Pain: Pt will demonstrate improved pain by reports of less than or equal to 5/10 worst pain on the verbal rating scale in order to progress toward maximal functional ability and improve QOL.   PC   2. Function: Patient will demonstrate improved function as indicated by a functional limitation score of less than or equal to 29 out of 100 on FOTO. PC   3. Mobility: Patient will improve AROM to stated goals, listed in objective measures above, in order to return to maximal functional potential and improve quality of life. PC   4. Strength: Patient will improve strength to stated goals, listed in objective measures above, in order to improve functional independence and quality of life. PC   5. Gait: Patient will demonstrate normalized gait mechanics with minimal compensation in order to return to PLOF. PC   6. Patient will return to normal ADL's, IADL's, community involvement, recreational activities, and work-related activities with less than or equal to 2/10 pain and maximal function.  PC   7. Patient will be able to return to recreational activities, such as working out and running with less pain and limitation.  PC      Goals Key:  PC= progressing/continue;        PM= partially met;             DC= discontinue    PLAN     Continue with physical therapy as planned.     7/1/2022: It is my recommendation that patient continue with PT at his current frequency of 2 times per week for the remainder of his approved visits. His treatment plan will remain the same, and he will be progressed appropriately.     Airam Kaye, PT, DPT

## 2022-07-15 ENCOUNTER — CLINICAL SUPPORT (OUTPATIENT)
Dept: REHABILITATION | Facility: HOSPITAL | Age: 37
End: 2022-07-15
Payer: COMMERCIAL

## 2022-07-15 DIAGNOSIS — S86.899A ANTERIOR SHIN SPLINTS: ICD-10-CM

## 2022-07-15 DIAGNOSIS — Z74.09 DECREASED STRENGTH, ENDURANCE, AND MOBILITY: Primary | ICD-10-CM

## 2022-07-15 DIAGNOSIS — R68.89 DECREASED STRENGTH, ENDURANCE, AND MOBILITY: Primary | ICD-10-CM

## 2022-07-15 DIAGNOSIS — M76.62 ACHILLES TENDINITIS OF BOTH LOWER EXTREMITIES: ICD-10-CM

## 2022-07-15 DIAGNOSIS — R53.1 DECREASED STRENGTH, ENDURANCE, AND MOBILITY: Primary | ICD-10-CM

## 2022-07-15 DIAGNOSIS — M76.61 ACHILLES TENDINITIS OF BOTH LOWER EXTREMITIES: ICD-10-CM

## 2022-07-15 DIAGNOSIS — M79.672 LEFT FOOT PAIN: ICD-10-CM

## 2022-07-15 PROCEDURE — 97110 THERAPEUTIC EXERCISES: CPT | Performed by: PHYSICAL THERAPIST

## 2022-07-15 PROCEDURE — 97112 NEUROMUSCULAR REEDUCATION: CPT | Performed by: PHYSICAL THERAPIST

## 2022-07-15 PROCEDURE — 97140 MANUAL THERAPY 1/> REGIONS: CPT | Performed by: PHYSICAL THERAPIST

## 2022-07-15 NOTE — PROGRESS NOTES
"[facility]   Physical Therapy Treatment Note     Name: Md Yi Johnson  Clinic Number: [id number]    Therapy Diagnosis:   Encounter Diagnoses   Name Primary?    Decreased strength, endurance, and mobility Yes    Achilles tendinitis of both lower extremities     Anterior shin splints     Left foot pain      Physician: Gabino Mata MD    Visit Date: 7/15/2022    Physician Orders: PT Eval and Treat  Medical Diagnosis from Referral: achilles tendinitis of both lower extremities   Evaluation Date: 5/19/2022  Authorization Period Expiration: 5/19/2023  Plan of Care Expiration: 8/17/2022  Progress Note Due: 7/31/2022  Visit # / Visits authorized: 5/20 (+1 eval)  FOTO: 1/3     Time In: 0710  Time Out: 0750  Total Billable Time: 40 minutes    Precautions: Standard    SUBJECTIVE     Pt reports: that he feels about the same but also not any worse. He states that he usually feels better after performing a few of his exercises. Patient reports that he is now able to bear a little more weight onto the ball of his left foot with less pain than before.    He was partially compliant with home exercise program.  Response to previous treatment: good  Functional change: improvement in toe yoga     Pain:  Current 0/10,   Location: left achilles tendon, right shin    OBJECTIVE     Objective Measures updated at progress report unless specified.      TREATMENT     Isauro received the treatments listed below:      Isauro received therapeutic exercises to develop strength, endurance, ROM, flexibility, posture and core stabilization for 8 minutes including:    Intervention Performed Today     Upright Bike - ROM and strength x 5', L4   Elliptical for ROM and strength  6' L4   Seated Heel Raise on 2inch   3x12 10# on knee   Ankle Plantaflexion Isometric  3 x 30" left, blue   Eccentric Heel Raise x 1 x 10, each LE   Gastroc Stretch on wedge   [id number]   Soleus Stretch on wedge   [id number]   Wall sit with heel " "raise  2 x 10   Progress Note  ROM and MMT re-assessment       Nafi participated in neuromuscular re-education activities to improve: Balance, Coordination, Sense, Proprioception and Posture for 18 minutes. The following activities were included:    Intervention Performed Today     MOBO Taps x  2x30 even & odd, each LE   Single Leg Dumbbell Pass   3 x 8, 5#   Toe Yoga x 2'   Toe abduction/adduction  2', each foot   Toe flexion/extension over roller  2' each foot   Short foot x 2' each foot   Marbles    1 round   Fwd Step Up and balance  2 x 12, 8"   Squat Taps x 2 x 10 (added)   Standing hip 3-way x  10x each LE (added)       MANUAL THERAPY TECHNIQUES were applied for (14) minutes, including:    Manual Intervention Performed Today    Soft Tissue Mobilization x bilateral gastrocnemius , soleus, tibialis posterior, foot intrinsics and plantar fascia   Joint Mobilizations     Mobilization with movement          Functional Dry Needling        Plan for Next Visit:         Patient Education and Home Exercises     Education provided:   · PURPOSE: Patient educated on the impairments noted above and the effects of physical therapy intervention to improve overall condition and QOL.   · EXERCISE: Patient was educated on all the above exercise prior/during/after for proper posture, positioning, and execution for safe performance with home exercise program.   · STRENGTH: Patient educated on the importance of improved core and extremity strength in order to improve alignment of the spine and extremities with static positions and dynamic movement.   · GAIT & BALANCE: Patient educated on the importance of strong core and lower extremity musculature in order to improve both static and dynamic balance, improve gait mechanics, reduce fall risk and improve household and community mobility.      Written Home Exercises Provided: yes.  Exercises were reviewed and Nafi was able to demonstrate them prior to the end of the session.  Nafi " demonstrated good  understanding of the education provided. See EMR under Patient Instructions for exercises provided during therapy sessions.    ASSESSMENT     Patient tolerated treatment well. Improved foot intrinsic strength noted today with toe yoga, short foot, and toe abduction/adduction. Patient still unable to lift up big toe alone without assistance from his other foot but is able to lift all other toes while keeping big toe down on L foot. Patient was able to be progressed today to additional balance and hip strengthening activities without complaint. Decreased soft tissue adhesions noted along gastroc/soleus musculature this visit.     Nafi Is progressing well towards his goals.   Pt prognosis is Good.     Pt will continue to benefit from skilled outpatient physical therapy to address the deficits listed in the problem list box on initial evaluation, provide pt/family education and to maximize pt's level of independence in the home and community environment.     Pt's spiritual, cultural and educational needs considered and pt agreeable to plan of care and goals.     Anticipated barriers to physical therapy: chronicity of condition    GOALS:     Short Term Goals:  6 weeks Progress    1. Pain: Pt will demonstrate improved pain by reports of less than or equal to 7/10 worst pain on the verbal rating scale in order to progress toward maximal functional ability and improve QOL. PC   2. Function: Patient will demonstrate improved function as indicated by a functional limitation score of less than or equal to 40 out of 100 on FOTO. PC   3. Strength: Patient will improve strength to 50% of stated goals, listed in objective measures above, in order to progress towards independence with functional activities.  PC   4. Gait: Patient will demonstrate improved gait mechanics in order to improve functional mobility, improve quality of life, and decrease risk of further injury or fall.  PC   5. HEP: Patient will  demonstrate independence with HEP in order to progress toward functional independence. PC      Long Term Goals:  12 weeks Progress   1. Pain: Pt will demonstrate improved pain by reports of less than or equal to 5/10 worst pain on the verbal rating scale in order to progress toward maximal functional ability and improve QOL.   PC   2. Function: Patient will demonstrate improved function as indicated by a functional limitation score of less than or equal to 29 out of 100 on FOTO. PC   3. Mobility: Patient will improve AROM to stated goals, listed in objective measures above, in order to return to maximal functional potential and improve quality of life. PC   4. Strength: Patient will improve strength to stated goals, listed in objective measures above, in order to improve functional independence and quality of life. PC   5. Gait: Patient will demonstrate normalized gait mechanics with minimal compensation in order to return to PLOF. PC   6. Patient will return to normal ADL's, IADL's, community involvement, recreational activities, and work-related activities with less than or equal to 2/10 pain and maximal function.  PC   7. Patient will be able to return to recreational activities, such as working out and running with less pain and limitation.  PC      Goals Key:  PC= progressing/continue;        PM= partially met;             DC= discontinue    PLAN     Continue with physical therapy as planned.     7/1/2022: It is my recommendation that patient continue with PT at his current frequency of 2 times per week for the remainder of his approved visits. His treatment plan will remain the same, and he will be progressed appropriately.     Airam Kaye, PT, DPT

## 2022-07-25 ENCOUNTER — CLINICAL SUPPORT (OUTPATIENT)
Dept: REHABILITATION | Facility: HOSPITAL | Age: 37
End: 2022-07-25
Payer: COMMERCIAL

## 2022-07-25 DIAGNOSIS — Z74.09 DECREASED STRENGTH, ENDURANCE, AND MOBILITY: Primary | ICD-10-CM

## 2022-07-25 DIAGNOSIS — M79.672 LEFT FOOT PAIN: ICD-10-CM

## 2022-07-25 DIAGNOSIS — M76.61 ACHILLES TENDINITIS OF BOTH LOWER EXTREMITIES: ICD-10-CM

## 2022-07-25 DIAGNOSIS — R68.89 DECREASED STRENGTH, ENDURANCE, AND MOBILITY: Primary | ICD-10-CM

## 2022-07-25 DIAGNOSIS — S86.899A ANTERIOR SHIN SPLINTS: ICD-10-CM

## 2022-07-25 DIAGNOSIS — M76.62 ACHILLES TENDINITIS OF BOTH LOWER EXTREMITIES: ICD-10-CM

## 2022-07-25 DIAGNOSIS — R53.1 DECREASED STRENGTH, ENDURANCE, AND MOBILITY: Primary | ICD-10-CM

## 2022-07-25 PROCEDURE — 97110 THERAPEUTIC EXERCISES: CPT | Performed by: PHYSICAL THERAPIST

## 2022-07-25 PROCEDURE — 97112 NEUROMUSCULAR REEDUCATION: CPT | Performed by: PHYSICAL THERAPIST

## 2022-07-25 NOTE — PROGRESS NOTES
"[facility]   Physical Therapy Treatment Note     Name: Md Yi Johnson  Clinic Number: [id number]    Therapy Diagnosis:   Encounter Diagnoses   Name Primary?    Decreased strength, endurance, and mobility Yes    Achilles tendinitis of both lower extremities     Anterior shin splints     Left foot pain      Physician: Gabino Mata MD    Visit Date: 7/25/2022    Physician Orders: PT Eval and Treat  Medical Diagnosis from Referral: achilles tendinitis of both lower extremities   Evaluation Date: 5/19/2022  Authorization Period Expiration: 5/19/2023  Plan of Care Expiration: 8/17/2022  Progress Note Due: 7/31/2022  Visit # / Visits authorized: 6/20 (+1 eval)  FOTO: 1/3     Time In: 0711  Time Out: 0756  Total Billable Time: 43 minutes    Precautions: Standard    SUBJECTIVE     Pt reports: that he is feeling some pain today. He went for a 3 mile run over the weekend. He reports that he did take about a 10 minute break between each mile, but his muscles were still very tense following.     He was partially compliant with home exercise program.  Response to previous treatment: good  Functional change: improvement in toe yoga     Pain:  Current 5/10,   Location: left achilles tendon, right shin    OBJECTIVE     Objective Measures updated at progress report unless specified.      TREATMENT     Isauro received the treatments listed below:      Isauro received therapeutic exercises to develop strength, endurance, ROM, flexibility, posture and core stabilization for 18 minutes including:    Intervention Performed Today     Upright Bike - ROM and strength x 5', L4   Elliptical for ROM and strength  6' L4   Seated Heel Raise on 2inch   3x12 10# on knee   Ankle Plantaflexion Isometric  3 x 30" left, blue   Eccentric Heel Raise x 1 x 10, each LE   Gastroc Stretch on wedge   [id number]   Soleus Stretch on wedge   [id number]   Wall sit with heel raise  2 x 10   HEP discussion & Return to Run x 10' " "  Progress Note  ROM and MMT re-assessment       Nafi participated in neuromuscular re-education activities to improve: Balance, Coordination, Sense, Proprioception and Posture for 25 minutes. The following activities were included:    Intervention Performed Today     MOBO Taps x  2x30 even & odd, each LE   Single Leg Dumbbell Pass   3 x 8, 5#   Toe Yoga  2'   Toe abduction/adduction  2', each foot   Toe flexion/extension over roller  2' each foot   Short foot  2' each foot   Marbles    1 round   Fwd Step Up and balance x 2 x 12,12 " box   Squat Taps x 2 x 10, 24" box   Standing hip 3-way x  10x each LE    Quadruped alt LE x 10x each LE (added)   Single leg ball toss on BOSU x Forward and each side (L LE only) - ~20 throws each direction (added)            MANUAL THERAPY TECHNIQUES were applied for (0) minutes, including:    Manual Intervention Performed Today    Soft Tissue Mobilization  bilateral gastrocnemius , soleus, tibialis posterior, foot intrinsics and plantar fascia   Joint Mobilizations     Mobilization with movement          Functional Dry Needling        Plan for Next Visit:         Patient Education and Home Exercises     Education provided:   · PURPOSE: Patient educated on the impairments noted above and the effects of physical therapy intervention to improve overall condition and QOL.   · EXERCISE: Patient was educated on all the above exercise prior/during/after for proper posture, positioning, and execution for safe performance with home exercise program.   · STRENGTH: Patient educated on the importance of improved core and extremity strength in order to improve alignment of the spine and extremities with static positions and dynamic movement.   · GAIT & BALANCE: Patient educated on the importance of strong core and lower extremity musculature in order to improve both static and dynamic balance, improve gait mechanics, reduce fall risk and improve household and community mobility.      Written Home " Exercises Provided: yes.  Exercises were reviewed and Isauro was able to demonstrate them prior to the end of the session.  Isauro demonstrated good  understanding of the education provided. See EMR under Patient Instructions for exercises provided during therapy sessions.    ASSESSMENT     Patient did well with treatment today. Focused more on hip, knee, and core functional strengthening. Patient demonstrates poor functional strength and balance. He had difficulty maintaining balance on moboboard and BOSU. He also required frequent cues to maintain proper form with quadruped alt LE. Even with tactile cues, patient still demonstrated difficulty maintain proper form with core and balance exercises today. RE-educated patient on importance of full HEP participation, as he still reports only performing 3 exercises as part of HEP. PT also discussed a return to run protocol with patient rather than jumping straight into longer distances and times, and PT explained how not progressing into a return to run protocol may set back his progress.     Isauro Is progressing well towards his goals.   Pt prognosis is Good.     Pt will continue to benefit from skilled outpatient physical therapy to address the deficits listed in the problem list box on initial evaluation, provide pt/family education and to maximize pt's level of independence in the home and community environment.     Pt's spiritual, cultural and educational needs considered and pt agreeable to plan of care and goals.     Anticipated barriers to physical therapy: chronicity of condition    GOALS:     Short Term Goals:  6 weeks Progress    1. Pain: Pt will demonstrate improved pain by reports of less than or equal to 7/10 worst pain on the verbal rating scale in order to progress toward maximal functional ability and improve QOL. PC   2. Function: Patient will demonstrate improved function as indicated by a functional limitation score of less than or equal to 40 out of 100  on FOTO. PC   3. Strength: Patient will improve strength to 50% of stated goals, listed in objective measures above, in order to progress towards independence with functional activities.  PC   4. Gait: Patient will demonstrate improved gait mechanics in order to improve functional mobility, improve quality of life, and decrease risk of further injury or fall.  PC   5. HEP: Patient will demonstrate independence with HEP in order to progress toward functional independence. PC      Long Term Goals:  12 weeks Progress   1. Pain: Pt will demonstrate improved pain by reports of less than or equal to 5/10 worst pain on the verbal rating scale in order to progress toward maximal functional ability and improve QOL.   PC   2. Function: Patient will demonstrate improved function as indicated by a functional limitation score of less than or equal to 29 out of 100 on FOTO. PC   3. Mobility: Patient will improve AROM to stated goals, listed in objective measures above, in order to return to maximal functional potential and improve quality of life. PC   4. Strength: Patient will improve strength to stated goals, listed in objective measures above, in order to improve functional independence and quality of life. PC   5. Gait: Patient will demonstrate normalized gait mechanics with minimal compensation in order to return to PLOF. PC   6. Patient will return to normal ADL's, IADL's, community involvement, recreational activities, and work-related activities with less than or equal to 2/10 pain and maximal function.  PC   7. Patient will be able to return to recreational activities, such as working out and running with less pain and limitation.  PC      Goals Key:  PC= progressing/continue;        PM= partially met;             DC= discontinue    PLAN     Continue with physical therapy as planned.     7/1/2022: It is my recommendation that patient continue with PT at his current frequency of 2 times per week for the remainder of his  approved visits. His treatment plan will remain the same, and he will be progressed appropriately.     Airam Kaye, PT, DPT

## 2022-08-01 ENCOUNTER — CLINICAL SUPPORT (OUTPATIENT)
Dept: REHABILITATION | Facility: HOSPITAL | Age: 37
End: 2022-08-01
Payer: COMMERCIAL

## 2022-08-01 DIAGNOSIS — Z74.09 DECREASED STRENGTH, ENDURANCE, AND MOBILITY: Primary | ICD-10-CM

## 2022-08-01 DIAGNOSIS — S86.899A ANTERIOR SHIN SPLINTS: ICD-10-CM

## 2022-08-01 DIAGNOSIS — R68.89 DECREASED STRENGTH, ENDURANCE, AND MOBILITY: Primary | ICD-10-CM

## 2022-08-01 DIAGNOSIS — M79.672 LEFT FOOT PAIN: ICD-10-CM

## 2022-08-01 DIAGNOSIS — M76.61 ACHILLES TENDINITIS OF BOTH LOWER EXTREMITIES: ICD-10-CM

## 2022-08-01 DIAGNOSIS — R53.1 DECREASED STRENGTH, ENDURANCE, AND MOBILITY: Primary | ICD-10-CM

## 2022-08-01 DIAGNOSIS — M76.62 ACHILLES TENDINITIS OF BOTH LOWER EXTREMITIES: ICD-10-CM

## 2022-08-01 PROCEDURE — 97110 THERAPEUTIC EXERCISES: CPT | Performed by: PHYSICAL THERAPIST

## 2022-08-01 PROCEDURE — 97140 MANUAL THERAPY 1/> REGIONS: CPT | Performed by: PHYSICAL THERAPIST

## 2022-08-01 NOTE — PROGRESS NOTES
[facility]   Physical Therapy Treatment Note + Progress Note    Name: Md Yi Johnson  Clinic Number: [id number]    Therapy Diagnosis:   Encounter Diagnoses   Name Primary?    Decreased strength, endurance, and mobility Yes    Achilles tendinitis of both lower extremities     Anterior shin splints     Left foot pain      Physician: Gabino Mata MD    Visit Date: 8/1/2022    Physician Orders: PT Eval and Treat  Medical Diagnosis from Referral: achilles tendinitis of both lower extremities   Evaluation Date: 5/19/2022  Authorization Period Expiration: 5/19/2023  Plan of Care Expiration: 8/17/2022  Progress Note Due: 8/17/2022  Visit # / Visits authorized: 7/20 (+1 eval)  FOTO: 1/3     Time In: 0719 (Late arrival)  Time Out: 0746  Total Billable Time: 27 minutes    Precautions: Standard    SUBJECTIVE     Pt reports: that he we walked/jogged approximately 3 miles twice this weekend, despite discussion last visit with PT about easing back into return to run protocol. He states that he did not have pain during the run, but he did have pain again after. However, he reports that his pain was not as bad as it used to be.     He was partially compliant with home exercise program.  Response to previous treatment: good  Functional change: improvement in toe yoga     Pain:  Current 3-4/10,   Location: left achilles tendon, right shin    OBJECTIVE     Objective Measures updated at progress report unless specified.     RANGE OF MOTION:     Ankle/Foot AROM/PROM Right Left Pain/Dysfunction with Movement Goal   Dorsiflexion (20) 15  (12 initial) 15  (10 initial) No pain with ROM measurements today 20   Plantarflexion (50) 45  (45 initial) 45  (42 initial)   50   Inversion (35) 30  (30 initial) 30  (30 initial)   35   Eversion (15) 15  (10 initial) 17  (10 initial)   15         STRENGTH:     L/E MMT Right Left Pain/Dysfunction with Movement Right   8/1/2022  Left  8/1/2022  Goal   Hip Flexion  5/5  "5/5 No pain with MMT's today 5 5 4+/5 B   Hip Extension  4+/5 4+/5   4 4 4+/5 B   Hip Abduction  4/5 4/5   4+ 4+ 4+/5 B   Knee Extension 5/5 5/5   5 5 5/5 B   Knee Flexion 5/5 5/5   5 5 5/5 B   Ankle DF 5/5 5/5   5 5 5/5 B   Ankle PF 5/5 4+/5   5 4+ 5/5 B   Ankle Inversion 5/5 4+/5   5 5 5/5 B   Ankle Eversion 5/5 4+/5   5 5 5/5 B         MUSCLE LENGTH:      Muscle Tested  Right Left  Goal   Hip Flexors decreased decreased Normal B   Quadriceps decreased decreased Normal B   Hamstrings  decreased decreased Normal B   Piriformis  decreased decreased Normal B   Gastrocnemius  decreased decreased Normal B   Soleus  decreased decreased Normal B        JOINT MOBILITY:      Joint Motion Tested Right Left  Goal   Talocrural Joint Normal Normal Normal B   Subtalar Joint Hypomobile Hypomobile Normal B         Sensation:  Sensation is intact to light touch      Palpation: Increased tone with palpation of left gastrocnemius , soleus and peroneals, but improved as compared to previous visits. Increased tenderness noted with palpation of left achilles tendon and right distal shin.      Gait Analysis: The patient ambulated with the following assistive device: none; the pt presents with the following gait abnormalities: decreased push off on left and ankle/foot supination on left     Function:       TREATMENT     Nafi received the treatments listed below:      Nafi received therapeutic exercises to develop strength, endurance, ROM, flexibility, posture and core stabilization for 15 minutes including:    Intervention Performed Today     Upright Bike - ROM and strength x 5', L4   Elliptical for ROM and strength  6' L4   Seated Heel Raise on 2inch   3x12 10# on knee   Ankle Plantaflexion Isometric  3 x 30" left, blue   Eccentric Heel Raise x 1 x 10, each LE   Gastroc Stretch on wedge   [id number]   Soleus Stretch on wedge   [id number]   Wall sit with heel raise  2 x 10   HEP discussion & Return to Run x 5'   Progress Note x ROM and MMT " "re-assessment       Nafi participated in neuromuscular re-education activities to improve: Balance, Coordination, Sense, Proprioception and Posture for 0 minutes. The following activities were included:    Intervention Performed Today     MOBO Taps   2x30 even & odd, each LE   Single Leg Dumbbell Pass   3 x 8, 5#   Toe Yoga  2'   Toe abduction/adduction  2', each foot   Toe flexion/extension over roller  2' each foot   Short foot  2' each foot   Marbles    1 round   Fwd Step Up and balance  2 x 12,12 " box   Squat Taps  2 x 10, 24" box   Standing hip 3-way  10x each LE    Quadruped alt LE  10x each LE (added)   Single leg ball toss on BOSU  Forward and each side (L LE only) - ~20 throws each direction (added)            MANUAL THERAPY TECHNIQUES were applied for (12) minutes, including:    Manual Intervention Performed Today    Soft Tissue Mobilization x bilateral gastrocnemius , soleus, tibialis posterior, foot intrinsics and plantar fascia   Joint Mobilizations     Mobilization with movement          Functional Dry Needling        Plan for Next Visit:         Patient Education and Home Exercises     Education provided:   · PURPOSE: Patient educated on the impairments noted above and the effects of physical therapy intervention to improve overall condition and QOL.   · EXERCISE: Patient was educated on all the above exercise prior/during/after for proper posture, positioning, and execution for safe performance with home exercise program.   · STRENGTH: Patient educated on the importance of improved core and extremity strength in order to improve alignment of the spine and extremities with static positions and dynamic movement.   · GAIT & BALANCE: Patient educated on the importance of strong core and lower extremity musculature in order to improve both static and dynamic balance, improve gait mechanics, reduce fall risk and improve household and community mobility.      Written Home Exercises Provided: " yes.  Exercises were reviewed and Isauro was able to demonstrate them prior to the end of the session.  Nafi demonstrated good  understanding of the education provided. See EMR under Patient Instructions for exercises provided during therapy sessions.    ASSESSMENT     Patient tolerated treatment well and has attended 8 total PT visits. Due to late arrival and progress note, patient was unable to perform as many exercises this visit but is appropriate to resume next visit. Treatment focused more on manual therapy this visit due to pain levels post walking/run this weekend. Patient demonstrates improvements in ankle ROM and lower extremity strength. However, he continues to present with poor functional strength and functional single leg balance. Decreased hip extension weakness especially noted this visit. Patient reports decreased TTP of left achilles tendon, but increased TTP of right anterior shin this visit. PT attempted to educate patient again on importance of full HEP participation, as he still reports only performing 3 exercises as part of HEP. Also reiterated the importance and benefit of easing back into walking/running as was discussed last visit. Patient was given a return to run protocol last visit but did not follow. Educated patient that even though he reports that he can only run on the weekends that he can still ease into it by following the protocol on the weekends. PT explained how not progressing into a return to run protocol may set back his progress. Patient would benefit from continued hip, knee, and core functional strengthening stabilization as he is progressed towards and independent HEP.     Isauro Is progressing well towards his goals.   Pt prognosis is Good.     Pt will continue to benefit from skilled outpatient physical therapy to address the deficits listed in the problem list box on initial evaluation, provide pt/family education and to maximize pt's level of independence in the home and  community environment.     Pt's spiritual, cultural and educational needs considered and pt agreeable to plan of care and goals.     Anticipated barriers to physical therapy: chronicity of condition    GOALS:     Short Term Goals:  6 weeks Progress    1. Pain: Pt will demonstrate improved pain by reports of less than or equal to 7/10 worst pain on the verbal rating scale in order to progress toward maximal functional ability and improve QOL. Met   2. Function: Patient will demonstrate improved function as indicated by a functional limitation score of less than or equal to 40 out of 100 on FOTO. Met   3. Strength: Patient will improve strength to 50% of stated goals, listed in objective measures above, in order to progress towards independence with functional activities.  Met   4. Gait: Patient will demonstrate improved gait mechanics in order to improve functional mobility, improve quality of life, and decrease risk of further injury or fall.  Met   5. HEP: Patient will demonstrate independence with HEP in order to progress toward functional independence. Met      Long Term Goals:  12 weeks Progress   1. Pain: Pt will demonstrate improved pain by reports of less than or equal to 5/10 worst pain on the verbal rating scale in order to progress toward maximal functional ability and improve QOL.   Met   2. Function: Patient will demonstrate improved function as indicated by a functional limitation score of less than or equal to 29 out of 100 on FOTO. PC   3. Mobility: Patient will improve AROM to stated goals, listed in objective measures above, in order to return to maximal functional potential and improve quality of life. PC   4. Strength: Patient will improve strength to stated goals, listed in objective measures above, in order to improve functional independence and quality of life. PM   5. Gait: Patient will demonstrate normalized gait mechanics with minimal compensation in order to return to PLOF. PC   6. Patient  will return to normal ADL's, IADL's, community involvement, recreational activities, and work-related activities with less than or equal to 2/10 pain and maximal function.  PC   7. Patient will be able to return to recreational activities, such as working out and running with less pain and limitation.  PC      Goals Key:  PC= progressing/continue;        PM= partially met;             DC= discontinue    PLAN     Continue with physical therapy as planned.     8/1/2022: It is my recommendation that patient continue with PT at his current frequency of 2 times per week for the remainder of his approved visits. His treatment plan will remain the same, and he will be progressed appropriately.     7/1/2022: It is my recommendation that patient continue with PT at his current frequency of 2 times per week for the remainder of his approved visits. His treatment plan will remain the same, and he will be progressed appropriately.     Airam Kaye, PT, DPT

## 2022-08-11 ENCOUNTER — TELEPHONE (OUTPATIENT)
Dept: REHABILITATION | Facility: HOSPITAL | Age: 37
End: 2022-08-11
Payer: COMMERCIAL

## 2022-08-11 NOTE — TELEPHONE ENCOUNTER
Called patient about no show. He states that he overslept because he just got back from a trip. Let patient know of next appointment time, and he stated that he would be there or let us know if he could not make it.  Airam Kaye, PT, DPT

## 2022-08-26 ENCOUNTER — DOCUMENTATION ONLY (OUTPATIENT)
Dept: REHABILITATION | Facility: HOSPITAL | Age: 37
End: 2022-08-26
Payer: COMMERCIAL

## 2022-08-26 PROBLEM — Z74.09 DECREASED STRENGTH, ENDURANCE, AND MOBILITY: Status: RESOLVED | Noted: 2022-05-19 | Resolved: 2022-08-26

## 2022-08-26 PROBLEM — M79.672 LEFT FOOT PAIN: Status: RESOLVED | Noted: 2022-05-19 | Resolved: 2022-08-26

## 2022-08-26 PROBLEM — R53.1 DECREASED STRENGTH, ENDURANCE, AND MOBILITY: Status: RESOLVED | Noted: 2022-05-19 | Resolved: 2022-08-26

## 2022-08-26 PROBLEM — R68.89 DECREASED STRENGTH, ENDURANCE, AND MOBILITY: Status: RESOLVED | Noted: 2022-05-19 | Resolved: 2022-08-26

## 2022-08-26 NOTE — PROGRESS NOTES
[facility]  Physical Therapy Discharge Note    Name: Md Yi Johnson  Clinic Number: [id number]    Therapy Diagnosis:        Encounter Diagnoses   Name Primary?    Decreased strength, endurance, and mobility Yes    Achilles tendinitis of both lower extremities      Anterior shin splints      Left foot pain        Physician: Gabino Mata MD    Physician Orders: PT Eval and Treat  Medical Diagnosis from Referral: achilles tendinitis of both lower extremities   Evaluation Date: 5/19/2022    Date of Last visit: 8/1/2022  Total Visits Received: 8    ASSESSMENT        Discharge reason: Patient has not attended therapy since 8/1/2022    Discharge FOTO Score: N/A Final FOTO not administered     GOALS:     Short Term Goals:  6 weeks Progress    1. Pain: Pt will demonstrate improved pain by reports of less than or equal to 7/10 worst pain on the verbal rating scale in order to progress toward maximal functional ability and improve QOL. Met   2. Function: Patient will demonstrate improved function as indicated by a functional limitation score of less than or equal to 40 out of 100 on FOTO. Met   3. Strength: Patient will improve strength to 50% of stated goals, listed in objective measures above, in order to progress towards independence with functional activities.  Met   4. Gait: Patient will demonstrate improved gait mechanics in order to improve functional mobility, improve quality of life, and decrease risk of further injury or fall.  Met   5. HEP: Patient will demonstrate independence with HEP in order to progress toward functional independence. Met      Long Term Goals:  12 weeks Progress   1. Pain: Pt will demonstrate improved pain by reports of less than or equal to 5/10 worst pain on the verbal rating scale in order to progress toward maximal functional ability and improve QOL.   Met   2. Function: Patient will demonstrate improved function as indicated by a functional limitation  score of less than or equal to 29 out of 100 on FOTO. PC   3. Mobility: Patient will improve AROM to stated goals, listed in objective measures above, in order to return to maximal functional potential and improve quality of life. PC   4. Strength: Patient will improve strength to stated goals, listed in objective measures above, in order to improve functional independence and quality of life. PM   5. Gait: Patient will demonstrate normalized gait mechanics with minimal compensation in order to return to PLOF. PC   6. Patient will return to normal ADL's, IADL's, community involvement, recreational activities, and work-related activities with less than or equal to 2/10 pain and maximal function.  PC   7. Patient will be able to return to recreational activities, such as working out and running with less pain and limitation.  PC      Goals Key:  PC= progressing/continue;        PM= partially met;             DC= discontinue    PLAN   This patient is discharged from Physical Therapy      Airam Kaye, PT

## 2023-06-21 DIAGNOSIS — M76.62 ACHILLES TENDONITIS, BILATERAL: Primary | ICD-10-CM

## 2023-06-21 DIAGNOSIS — M76.61 ACHILLES TENDONITIS, BILATERAL: Primary | ICD-10-CM

## 2023-06-22 ENCOUNTER — OFFICE VISIT (OUTPATIENT)
Dept: ORTHOPEDICS | Facility: CLINIC | Age: 38
End: 2023-06-22
Payer: COMMERCIAL

## 2023-06-22 ENCOUNTER — HOSPITAL ENCOUNTER (OUTPATIENT)
Dept: RADIOLOGY | Facility: HOSPITAL | Age: 38
Discharge: HOME | End: 2023-06-22
Attending: PHYSICIAN ASSISTANT
Payer: COMMERCIAL

## 2023-06-22 VITALS — WEIGHT: 170 LBS | HEIGHT: 71 IN | BODY MASS INDEX: 23.8 KG/M2

## 2023-06-22 DIAGNOSIS — M76.62 ACHILLES TENDONITIS, BILATERAL: ICD-10-CM

## 2023-06-22 DIAGNOSIS — M77.32 CALCANEAL SPUR OF LEFT FOOT: ICD-10-CM

## 2023-06-22 DIAGNOSIS — M76.62 ACHILLES TENDINITIS OF BOTH LOWER EXTREMITIES: Primary | ICD-10-CM

## 2023-06-22 DIAGNOSIS — M76.61 ACHILLES TENDONITIS, BILATERAL: ICD-10-CM

## 2023-06-22 DIAGNOSIS — M76.61 ACHILLES TENDINITIS OF BOTH LOWER EXTREMITIES: Primary | ICD-10-CM

## 2023-06-22 PROCEDURE — 3008F BODY MASS INDEX DOCD: CPT | Mod: CPTII,S$GLB,, | Performed by: PHYSICIAN ASSISTANT

## 2023-06-22 PROCEDURE — 1159F PR MEDICATION LIST DOCUMENTED IN MEDICAL RECORD: ICD-10-PCS | Mod: CPTII,S$GLB,, | Performed by: PHYSICIAN ASSISTANT

## 2023-06-22 PROCEDURE — 73610 X-RAY EXAM OF ANKLE: CPT | Mod: 26,LT,, | Performed by: RADIOLOGY

## 2023-06-22 PROCEDURE — 1159F MED LIST DOCD IN RCRD: CPT | Mod: CPTII,S$GLB,, | Performed by: PHYSICIAN ASSISTANT

## 2023-06-22 PROCEDURE — 1160F RVW MEDS BY RX/DR IN RCRD: CPT | Mod: CPTII,S$GLB,, | Performed by: PHYSICIAN ASSISTANT

## 2023-06-22 PROCEDURE — 99999 PR PBB SHADOW E&M-EST. PATIENT-LVL III: CPT | Mod: PBBFAC,,, | Performed by: PHYSICIAN ASSISTANT

## 2023-06-22 PROCEDURE — 99204 PR OFFICE/OUTPT VISIT, NEW, LEVL IV, 45-59 MIN: ICD-10-PCS | Mod: S$GLB,,, | Performed by: PHYSICIAN ASSISTANT

## 2023-06-22 PROCEDURE — 73610 X-RAY EXAM OF ANKLE: CPT | Mod: TC,50

## 2023-06-22 PROCEDURE — 99204 OFFICE O/P NEW MOD 45 MIN: CPT | Mod: S$GLB,,, | Performed by: PHYSICIAN ASSISTANT

## 2023-06-22 PROCEDURE — 99999 PR PBB SHADOW E&M-EST. PATIENT-LVL III: ICD-10-PCS | Mod: PBBFAC,,, | Performed by: PHYSICIAN ASSISTANT

## 2023-06-22 PROCEDURE — 73610 XR ANKLE COMPLETE 3 VIEW BILATERAL: ICD-10-PCS | Mod: 26,RT,, | Performed by: RADIOLOGY

## 2023-06-22 PROCEDURE — 73610 X-RAY EXAM OF ANKLE: CPT | Mod: 26,RT,, | Performed by: RADIOLOGY

## 2023-06-22 PROCEDURE — 1160F PR REVIEW ALL MEDS BY PRESCRIBER/CLIN PHARMACIST DOCUMENTED: ICD-10-PCS | Mod: CPTII,S$GLB,, | Performed by: PHYSICIAN ASSISTANT

## 2023-06-22 PROCEDURE — 3008F PR BODY MASS INDEX (BMI) DOCUMENTED: ICD-10-PCS | Mod: CPTII,S$GLB,, | Performed by: PHYSICIAN ASSISTANT

## 2023-06-22 RX ORDER — DICLOFENAC SODIUM 10 MG/G
2 GEL TOPICAL 3 TIMES DAILY
Qty: 1 EACH | Refills: 1 | Status: SHIPPED | OUTPATIENT
Start: 2023-06-22

## 2023-06-22 RX ORDER — TRAMADOL HYDROCHLORIDE 50 MG/1
50 TABLET ORAL EVERY 6 HOURS
Qty: 20 TABLET | Refills: 0 | Status: SHIPPED | OUTPATIENT
Start: 2023-06-22

## 2023-06-22 NOTE — PATIENT INSTRUCTIONS
Assessment:  Md Yi Johnson is a  38 y.o. male   Data Unavailable with a chief complaint of Pain of the Left Ankle and Pain of the Right Ankle  Presents today for several pain in the left heel, worse with standing and walking.   Left calcaneal spur    Encounter Diagnoses   Name Primary?    Achilles tendinitis of both lower extremities Yes    Calcaneal spur of left foot       Plan:  Referral to podiatry  Referral to physical therapy at [facility] with   Voltaren gel  Ibuprofen as needed  Tramadol as needed for pain  Recommended to start using crutches as needed for next week, wean off crutches after 1 week     Follow-up: With Podiatry or sooner if there are any problems between now and then.    Leave Review:   Google: Leave Google Review  Healthgrades: Leave Healthgrades Review    After Hours Number: [phone]

## 2023-06-22 NOTE — PROGRESS NOTES
Patient ID: Md Yi Johnson  YOB: 1985  MRN: [id number]    Chief Complaint: Pain of the Left Ankle and Pain of the Right Ankle    Referred By: Franca    History of Present Illness: Md Yi Johnson is a  38 y.o. male   Data Unavailable with a chief complaint of Pain of the Left Ankle and Pain of the Right Ankle    Isauro is here today for Koby achilles pain. He started having pain on 6/9. He normally walks on the treadmill everyday. He states that his pain has progressively gotten worse. He states both achilles hurt but the left hurts more. He rates his pain as 9/10. He has a hx of this pain before and went to PT for it last year. He states that ice has helped alleviate his pain. He states that his pain is at a place that he would be unable to do PT at the moment and would like medication.     HPI    Past Medical History:   History reviewed. No pertinent past medical history.  History reviewed. No pertinent surgical history.  History reviewed. No pertinent family history.  Social History     Socioeconomic History    Marital status:    Tobacco Use    Smoking status: Never    Smokeless tobacco: Never   Substance and Sexual Activity    Alcohol use: Never    Drug use: Never       Review of patient's allergies indicates:  No Known Allergies  Review of Systems   Constitutional: Negative for chills and fever.   HENT:  Negative for sore throat.    Eyes:  Negative for pain.   Cardiovascular:  Negative for chest pain and leg swelling.   Respiratory:  Negative for cough and shortness of breath.    Skin:  Negative for itching and rash.   Musculoskeletal:  Positive for joint pain and joint swelling.   Gastrointestinal:  Negative for abdominal pain, nausea and vomiting.   Genitourinary:  Negative for dysuria.   Neurological:  Negative for dizziness, numbness and paresthesias.     Physical Exam:   Body mass index is 23.71 kg/m².  There were no vitals filed for this visit.   GENERAL: Well appearing,  appropriate for stated age, no acute distress.  CARDIOVASCULAR: Pulses regular by peripheral palpation.  PULMONARY: Respirations are even and non-labored.  NEURO: Awake, alert, and oriented x 3.  PSYCH: Mood & affect are appropriate.  HEENT: Head is normocephalic and atraumatic.  General    Nursing note and vitals reviewed.        Right Ankle/Foot Exam     Other   Sensation: normal    Left Ankle/Foot Exam     Swelling   The patient is swollen on the Achilles insertion and Achilles tendon.    Tenderness   The patient is tender to palpation of the Achilles insertion and Achilles tendon.    Other   Sensation: normal    Comments:  Tender over the heel and achilles insertion. Mild edema in area.       Muscle Strength   Right Lower Extremity   Anterior tibial:  5/5   Posterior tibial:  5/5   Gastrocsoleus:  5/5   Peroneal muscle:  5/5   EHL:  5/5  FDL: 5/5  EDL: 5/5  FHL: 5/5  Left Lower Extremity   Anterior tibial:  5/5   Posterior tibial:  5/5   Gastrocsoleus:  5/5   Peroneal muscle:  5/5   EHL:  5/5  FDL: 5/5  EDL: 5/5  FHL: 5/5    All compartments are soft and compressible. Calf soft non-tender. Intact EHL, FHL, gastroc soleus, and tibialis anterior. Sensation intact to light touch in superficial peroneal, deep peroneal, tibial, sural, and saphenous nerve distributions. Foot warm and well perfused with capillary refill of less than 2 seconds and palpable pedal pulses.     Imaging:    X-Ray Ankle Complete Bilateral  Narrative: EXAM: XR ANKLE COMPLETE 3 VIEW BILATERAL    HISTORY: Pain    FINDINGS:   No acute fracture, dislocation or radiopaque foreign body.  There is inferior and posterior calcaneal spurring.  No acute fracture or dislocation.  No arthritic changes.  Impression:  Calcaneal spurring.    Finalized on: 6/22/2023 8:50 AM By:  Gerardo Johnson MD  BRRG# [id number]      2023-06-22 08:52:[id number]    KELLY      Relevant imaging results reviewed and interpreted by me, discussed with the patient and / or family today.      Other Tests:       Patient Instructions   Assessment:  Md Yi Johnson is a  38 y.o. male   Data Unavailable with a chief complaint of Pain of the Left Ankle and Pain of the Right Ankle  Presents today for several pain in the left heel, worse with standing and walking.   Left calcaneal spur    Encounter Diagnoses   Name Primary?    Achilles tendinitis of both lower extremities Yes    Calcaneal spur of left foot       Plan:  Referral to podiatry  Referral to physical therapy at [facility] with   Voltaren gel  Ibuprofen as needed  Tramadol as needed for pain  Recommended to start using crutches as needed for next week, wean off crutches after 1 week     Follow-up: With Podiatry or sooner if there are any problems between now and then.    Leave Review:   Google: Leave Google Review  Healthgrades: Leave Healthgrades Review    After Hours Number: [phone]      Provider Note/Medical Decision Making:     I discussed worrisome and red flag signs and symptoms with the patient. The patient expressed understanding and agreed to alert me immediately or to go to the emergency room if they experience any of these.   Treatment plan was developed with input from the patient/family, and they expressed understanding and agreement with the plan. All questions were answered today.      Disclaimer: This note was prepared using a voice recognition system and is likely to have sound alike errors within the text.

## 2024-08-13 ENCOUNTER — OFFICE VISIT (OUTPATIENT)
Dept: PODIATRY | Facility: CLINIC | Age: 39
End: 2024-08-13
Payer: COMMERCIAL

## 2024-08-13 VITALS — BODY MASS INDEX: 23.8 KG/M2 | WEIGHT: 170 LBS | HEIGHT: 71 IN

## 2024-08-13 DIAGNOSIS — M76.62 TENDONITIS, ACHILLES, LEFT: ICD-10-CM

## 2024-08-13 DIAGNOSIS — M25.572 PAIN IN LEFT ANKLE AND JOINTS OF LEFT FOOT: ICD-10-CM

## 2024-08-13 DIAGNOSIS — S86.012A STRAIN OF ACHILLES TENDON, LEFT, INITIAL ENCOUNTER: Primary | ICD-10-CM

## 2024-08-13 DIAGNOSIS — M77.52 BURSITIS OF POSTERIOR HEEL, LEFT: ICD-10-CM

## 2024-08-13 DIAGNOSIS — M24.572 CONTRACTURE, LEFT ANKLE: ICD-10-CM

## 2024-08-13 PROCEDURE — 99999 PR PBB SHADOW E&M-EST. PATIENT-LVL III: CPT | Mod: PBBFAC,,, | Performed by: PODIATRIST

## 2024-08-13 PROCEDURE — 99204 OFFICE O/P NEW MOD 45 MIN: CPT | Mod: S$GLB,,, | Performed by: PODIATRIST

## 2024-08-13 RX ORDER — DICLOFENAC SODIUM 75 MG/1
75 TABLET, DELAYED RELEASE ORAL 2 TIMES DAILY
Qty: 60 TABLET | Refills: 1 | Status: SHIPPED | OUTPATIENT
Start: 2024-08-13 | End: 2024-09-12

## 2024-08-13 RX ORDER — PREDNISONE 20 MG/1
20 TABLET ORAL DAILY
Qty: 5 TABLET | Refills: 0 | Status: SHIPPED | OUTPATIENT
Start: 2024-08-13 | End: 2024-08-18

## 2024-08-13 RX ORDER — TRAMADOL HYDROCHLORIDE 50 MG/1
TABLET ORAL
Qty: 28 TABLET | Refills: 0 | Status: SHIPPED | OUTPATIENT
Start: 2024-08-13

## 2024-08-13 NOTE — PROGRESS NOTES
Subjective:       Patient ID: Md Yi Johnson is a 39 y.o. male.    Chief Complaint: Foot Pain (Foot pain, rate pain 9/10, nondiabetic, pt is wearing slippers )      HPI: Md Yi Johnson complains of moderate to severe pains to the left posterior heel/foot. States pains are sharp and stabbing-like in nature. Pains are to the posterior aspect of the ankle joint, mostly with walking and standing. Rates the pains at approx. 10/10. States post-static dyskinesia to this area and the plantar heel as well. Denies any recent identifiable trauma. Does limp with gait. States NSAID medications thus far for treatment. Pains have been present for the past several weeks to months and the pains have worsened over the past couple of weeks. States walking and standing causes and/or exacerbates the symptoms. Patient's Primary Care Provider is No, Primary Doctor. Patient does have a Hx of posterior heel bursitis. States out-patient PT/OT.     Review of patient's allergies indicates:  No Known Allergies    History reviewed. No pertinent past medical history.    No family history on file.    Social History     Socioeconomic History    Marital status:    Tobacco Use    Smoking status: Never    Smokeless tobacco: Never   Substance and Sexual Activity    Alcohol use: Never    Drug use: Never       History reviewed. No pertinent surgical history.    Review of Systems   Constitutional:  Negative for chills, fatigue and fever.   HENT:  Negative for hearing loss.    Eyes:  Negative for photophobia and visual disturbance.   Respiratory:  Negative for cough, chest tightness, shortness of breath and wheezing.    Cardiovascular:  Negative for chest pain and palpitations.   Gastrointestinal:  Negative for constipation, diarrhea, nausea and vomiting.   Endocrine: Negative for cold intolerance and heat intolerance.   Genitourinary:  Negative for flank pain.   Musculoskeletal:  Positive for gait problem. Negative for neck pain and neck  "stiffness.   Neurological:  Negative for light-headedness and headaches.   Psychiatric/Behavioral:  Negative for sleep disturbance.           Objective:   Ht 5' 11" (1.803 m)   Wt 77.1 kg (169 lb 15.6 oz)   BMI 23.71 kg/m²     X-Ray Ankle Complete Bilateral  Narrative: EXAM: XR ANKLE COMPLETE 3 VIEW BILATERAL    HISTORY: Pain    FINDINGS:   No acute fracture, dislocation or radiopaque foreign body.  There is inferior and posterior calcaneal spurring.  No acute fracture or dislocation.  No arthritic changes.  Impression:  Calcaneal spurring.    Finalized on: 6/22/2023 8:50 AM By:  Gerardo Johnson MD  [facility]# [id number]      2023-06-22 08:[id number]    [facility]       Physical Exam  LOWER EXTREMITY PHYSICAL EXAMINATION  ORTHOPEDIC: There is a small palpable retro-calcaneal spur and/or a Bernie's deformity noted on the left foot. There is moderate tenderness to palpation of the achilles tendon insertion on to the posterior superior calcaneus. Upon medial to lateral compression of the heel bone at the distal most insertion of the achilles tendon, there is moderate discomfort. There is no tenderness to palpation of the plantar medial calcaneal tubercle at the origin of the plantar fascia. Upon palpation of the achilles tendon, there are no defects and/or fusiform swelling noted. There is no tenderness to palpation, save for at approx. 1cm-2cm proximal to the achilles insertion on the calcaneus. MMT in the sagittal plane with dorsiflexion is 5/5 and with plantarflexion, it is too 5/5, but with pain and gaurding. Plantarflexion ability on this limb is decreased as compared to contra-lateral. Ankle ROM is not painful and/or creptiant. Equinus is noted. Gait pattern is antalgic at present.     Assessment:     1. Strain of Achilles tendon, left, initial encounter    2. Bursitis of posterior heel, left    3. Contracture, left ankle    4. Tendonitis, Achilles, left    5. Pain in left ankle and joints of left foot        Plan: "     Strain of Achilles tendon, left, initial encounter  -     predniSONE (DELTASONE) 20 MG tablet; Take 1 tablet (20 mg total) by mouth once daily. for 5 days  Dispense: 5 tablet; Refill: 0  -     diclofenac (VOLTAREN) 75 MG EC tablet; Take 1 tablet (75 mg total) by mouth 2 (two) times daily.  Dispense: 60 tablet; Refill: 1    Bursitis of posterior heel, left  -     predniSONE (DELTASONE) 20 MG tablet; Take 1 tablet (20 mg total) by mouth once daily. for 5 days  Dispense: 5 tablet; Refill: 0  -     diclofenac (VOLTAREN) 75 MG EC tablet; Take 1 tablet (75 mg total) by mouth 2 (two) times daily.  Dispense: 60 tablet; Refill: 1    Contracture, left ankle    Tendonitis, Achilles, left    Pain in left ankle and joints of left foot  -     predniSONE (DELTASONE) 20 MG tablet; Take 1 tablet (20 mg total) by mouth once daily. for 5 days  Dispense: 5 tablet; Refill: 0  -     diclofenac (VOLTAREN) 75 MG EC tablet; Take 1 tablet (75 mg total) by mouth 2 (two) times daily.  Dispense: 60 tablet; Refill: 1  -     traMADoL (ULTRAM) 50 mg tablet; Take 1 to 2 tablets by mouth, every 6 hours as needed for pain relief.  Dispense: 28 tablet; Refill: 0      Thorough discussion is had with the patient today, concerning the diagnosis, its etiology, and the treatment algorithm at present.     XRAYS are reviewed in detail with the patient. All questions and concerns regarding findings and its/their implications are outlined and discussed.    Start CAM Walker for duration of 2 weeks.    Follow up in 2 weeks.    Has had prior OT/PT for this pathology.    The procedure of (resection of posterior heel spur with repair of Achilles tendon) was thoroughly explained to the patient. Its necessity and/or purpose and the implications therein were outlined, including any pertinent advantages and/or disadvantages, and possible complications, if any. Possible complications include recurrence of pathology and/or deformity, infection (cellulitis, drainage,  purulence, malodor, etc...), pain, numbness, neuritis, edema, burning, loss of function, need for further surgery, possible need for removal of any implanted hardware, soft tissue contracture and/or scarring, etc... No guarantees were given and/or implied. Post-operative expectations and weightbearing protocol is thoroughly explained to the patient, who acknowledges understanding.           No future appointments.

## 2024-08-22 DIAGNOSIS — S86.012A STRAIN OF ACHILLES TENDON, LEFT, INITIAL ENCOUNTER: ICD-10-CM

## 2024-08-22 DIAGNOSIS — M77.52 BURSITIS OF POSTERIOR HEEL, LEFT: ICD-10-CM

## 2024-08-22 DIAGNOSIS — M25.572 PAIN IN LEFT ANKLE AND JOINTS OF LEFT FOOT: ICD-10-CM

## 2024-08-22 RX ORDER — TRAMADOL HYDROCHLORIDE 50 MG/1
TABLET ORAL
Qty: 28 TABLET | Refills: 0 | Status: SHIPPED | OUTPATIENT
Start: 2024-08-22

## 2024-08-22 RX ORDER — DICLOFENAC SODIUM 75 MG/1
75 TABLET, DELAYED RELEASE ORAL 2 TIMES DAILY
Qty: 60 TABLET | Refills: 1 | Status: SHIPPED | OUTPATIENT
Start: 2024-08-22 | End: 2024-09-21

## 2024-08-22 RX ORDER — PREDNISONE 20 MG/1
20 TABLET ORAL DAILY
Qty: 5 TABLET | Refills: 0 | Status: SHIPPED | OUTPATIENT
Start: 2024-08-22 | End: 2024-08-27

## 2024-09-21 DIAGNOSIS — M25.572 PAIN IN LEFT ANKLE AND JOINTS OF LEFT FOOT: ICD-10-CM

## 2024-09-22 RX ORDER — TRAMADOL HYDROCHLORIDE 50 MG/1
50 TABLET ORAL EVERY 6 HOURS PRN
Qty: 28 TABLET | Refills: 0 | Status: SHIPPED | OUTPATIENT
Start: 2024-09-22 | End: 2024-09-29

## 2024-10-28 ENCOUNTER — PATIENT MESSAGE (OUTPATIENT)
Dept: PODIATRY | Facility: CLINIC | Age: 39
End: 2024-10-28
Payer: COMMERCIAL

## 2024-10-28 DIAGNOSIS — S86.012A STRAIN OF ACHILLES TENDON, LEFT, INITIAL ENCOUNTER: Primary | ICD-10-CM

## 2024-10-28 RX ORDER — DICLOFENAC SODIUM 10 MG/G
2 GEL TOPICAL 3 TIMES DAILY
Qty: 1 EACH | Refills: 1 | Status: SHIPPED | OUTPATIENT
Start: 2024-10-28

## 2024-10-28 RX ORDER — DICLOFENAC SODIUM 10 MG/G
2 GEL TOPICAL 3 TIMES DAILY
Qty: 1 EACH | Refills: 1 | Status: SHIPPED | OUTPATIENT
Start: 2024-10-28 | End: 2024-10-28 | Stop reason: SDUPTHER

## 2024-12-12 ENCOUNTER — PATIENT MESSAGE (OUTPATIENT)
Dept: RESEARCH | Facility: HOSPITAL | Age: 39
End: 2024-12-12
Payer: COMMERCIAL

## 2024-12-25 DIAGNOSIS — M25.572 PAIN IN LEFT ANKLE AND JOINTS OF LEFT FOOT: ICD-10-CM

## 2024-12-25 DIAGNOSIS — S86.012A STRAIN OF ACHILLES TENDON, LEFT, INITIAL ENCOUNTER: ICD-10-CM

## 2024-12-25 DIAGNOSIS — M77.52 BURSITIS OF POSTERIOR HEEL, LEFT: ICD-10-CM

## 2024-12-25 RX ORDER — DICLOFENAC SODIUM 75 MG/1
75 TABLET, DELAYED RELEASE ORAL 2 TIMES DAILY
Qty: 60 TABLET | Refills: 1 | Status: SHIPPED | OUTPATIENT
Start: 2024-12-25

## 2025-03-21 DIAGNOSIS — S86.012A STRAIN OF ACHILLES TENDON, LEFT, INITIAL ENCOUNTER: ICD-10-CM

## 2025-03-21 DIAGNOSIS — M25.572 PAIN IN LEFT ANKLE AND JOINTS OF LEFT FOOT: ICD-10-CM

## 2025-03-21 DIAGNOSIS — M77.52 BURSITIS OF POSTERIOR HEEL, LEFT: ICD-10-CM

## 2025-03-21 RX ORDER — DICLOFENAC SODIUM 75 MG/1
75 TABLET, DELAYED RELEASE ORAL 2 TIMES DAILY
Qty: 60 TABLET | Refills: 1 | Status: SHIPPED | OUTPATIENT
Start: 2025-03-21